# Patient Record
Sex: FEMALE | Race: WHITE | NOT HISPANIC OR LATINO | Employment: PART TIME | URBAN - METROPOLITAN AREA
[De-identification: names, ages, dates, MRNs, and addresses within clinical notes are randomized per-mention and may not be internally consistent; named-entity substitution may affect disease eponyms.]

---

## 2017-01-13 DIAGNOSIS — B00.9 HERPES SIMPLEX: ICD-10-CM

## 2017-01-13 RX ORDER — ACYCLOVIR 400 MG/1
400 TABLET ORAL 3 TIMES DAILY PRN
Qty: 15 TAB | Refills: 3 | Status: SHIPPED | OUTPATIENT
Start: 2017-01-13 | End: 2017-11-29

## 2017-02-09 DIAGNOSIS — Z79.890 PREMATURE SURGICAL MENOPAUSE ON HRT: ICD-10-CM

## 2017-02-09 DIAGNOSIS — E89.40 PREMATURE SURGICAL MENOPAUSE ON HRT: ICD-10-CM

## 2017-02-09 RX ORDER — ESTRADIOL 1 MG/1
1 TABLET ORAL DAILY
Qty: 90 TAB | Refills: 3 | Status: SHIPPED | OUTPATIENT
Start: 2017-02-09 | End: 2018-03-28 | Stop reason: SDUPTHER

## 2017-02-09 RX ORDER — ESTERIFIED ESTROGEN AND METHYLTESTOSTERONE .625; 1.25 MG/1; MG/1
1 TABLET ORAL DAILY
Qty: 90 TAB | Refills: 3 | Status: SHIPPED
Start: 2017-02-09 | End: 2017-09-05 | Stop reason: SDUPTHER

## 2017-02-09 NOTE — TELEPHONE ENCOUNTER
Was the patient seen in the last year in this department? Yes  last fill 1/13/17 #30    Does patient have an active prescription for medications requested? No     Received Request Via: Pharmacy

## 2017-02-16 ENCOUNTER — HOSPITAL ENCOUNTER (OUTPATIENT)
Facility: MEDICAL CENTER | Age: 47
End: 2017-02-16
Attending: NURSE PRACTITIONER
Payer: COMMERCIAL

## 2017-02-16 ENCOUNTER — OFFICE VISIT (OUTPATIENT)
Dept: MEDICAL GROUP | Facility: MEDICAL CENTER | Age: 47
End: 2017-02-16
Payer: COMMERCIAL

## 2017-02-16 VITALS
SYSTOLIC BLOOD PRESSURE: 98 MMHG | OXYGEN SATURATION: 97 % | DIASTOLIC BLOOD PRESSURE: 80 MMHG | BODY MASS INDEX: 23.9 KG/M2 | HEIGHT: 64 IN | WEIGHT: 140 LBS | HEART RATE: 91 BPM | TEMPERATURE: 98.6 F | RESPIRATION RATE: 12 BRPM

## 2017-02-16 DIAGNOSIS — N39.0 URINARY TRACT INFECTION, SITE UNSPECIFIED: ICD-10-CM

## 2017-02-16 LAB
APPEARANCE UR: NORMAL
BILIRUB UR STRIP-MCNC: NEGATIVE MG/DL
COLOR UR AUTO: YELLOW
GLUCOSE UR STRIP.AUTO-MCNC: NEGATIVE MG/DL
KETONES UR STRIP.AUTO-MCNC: NEGATIVE MG/DL
LEUKOCYTE ESTERASE UR QL STRIP.AUTO: NORMAL
NITRITE UR QL STRIP.AUTO: POSITIVE
PH UR STRIP.AUTO: 6 [PH] (ref 5–8)
PROT UR QL STRIP: NEGATIVE MG/DL
RBC UR QL AUTO: NORMAL
SP GR UR STRIP.AUTO: 1.02
UROBILINOGEN UR STRIP-MCNC: NEGATIVE MG/DL

## 2017-02-16 PROCEDURE — 87086 URINE CULTURE/COLONY COUNT: CPT

## 2017-02-16 PROCEDURE — 99214 OFFICE O/P EST MOD 30 MIN: CPT | Performed by: NURSE PRACTITIONER

## 2017-02-16 PROCEDURE — 81002 URINALYSIS NONAUTO W/O SCOPE: CPT | Performed by: NURSE PRACTITIONER

## 2017-02-16 RX ORDER — NITROFURANTOIN 25; 75 MG/1; MG/1
100 CAPSULE ORAL 2 TIMES DAILY
Qty: 14 CAP | Refills: 0 | Status: SHIPPED | OUTPATIENT
Start: 2017-02-16 | End: 2017-02-23

## 2017-02-16 NOTE — MR AVS SNAPSHOT
"Louann Infante   2017 11:40 AM   Office Visit   MRN: [id number]    Department:  [address]   Dept Phone:  [phone]    Description:  Female : 1970   Provider:  ELMER Blake           Reason for Visit     Urinary Frequency frequency & pain, onset 5 days       Allergies as of 2017     Allergen Noted Reactions    Codeine 2012   Vomiting    Hallucinations    Sulfa Drugs 2012   Vomiting      You were diagnosed with     Urinary tract infection, site unspecified   [[id number]]         Vital Signs     Blood Pressure Pulse Temperature Respirations Height Weight    98/80 mmHg 91 37 °C (98.6 °F) 12 1.626 m (5' 4\") 63.504 kg (140 lb)    Body Mass Index Oxygen Saturation Smoking Status             24.02 kg/m2 97% Never Smoker          Basic Information     Date Of Birth Sex Race Ethnicity Preferred Language    1970 Female White Non- English      Problem List              ICD-10-CM Priority Class Noted - Resolved    Sleep disorder G47.9   Unknown - Present    Migraines G43.909   Unknown - Present    Premature surgical menopause on HRT E89.40   10/22/2014 - Present      Health Maintenance        Date Due Completion Dates    IMM DTaP/Tdap/Td Vaccine (1 - Tdap) 7/10/1991 ---    MAMMOGRAM 7/10/2012 ---    IMM INFLUENZA (1) 2016 ---            Results     POCT Urinalysis      Component Value Standard Range & Units    POC Color yellow Negative    POC Appearance cloudy Negative    POC Leukocyte Esterase small Negative    POC Nitrites positive Negative    POC Urobiligen negative Negative (0.2) mg/dL    POC Protein negative Negative mg/dL    POC Urine PH 6.0 5.0 - 8.0    POC Blood small Negative    POC Specific Gravity 1.025 <1.005 - >1.030    POC Ketones negative Negative mg/dL    POC Biliruben negative Negative mg/dL    POC Glucose negative Negative mg/dL                        Current Immunizations     No immunizations on file.      Below and/or attached " are the medications your provider expects you to take. Review all of your home medications and newly ordered medications with your provider and/or pharmacist. Follow medication instructions as directed by your provider and/or pharmacist. Please keep your medication list with you and share with your provider. Update the information when medications are discontinued, doses are changed, or new medications (including over-the-counter products) are added; and carry medication information at all times in the event of emergency situations     Allergies:  CODEINE - Vomiting     SULFA DRUGS - Vomiting               Medications  Valid as of: February 16, 2017 - 12:01 PM    Generic Name Brand Name Tablet Size Instructions for use    Acyclovir (Tab) ZOVIRAX 400 MG Take 1 Tab by mouth 3 times a day as needed.        ALPRAZolam (Tab) XANAX 0.5 MG TAKE ONE TABLET BY MOUTH AT BEDTIME ASNEEDED FOR SLEEP -GENERIC FOR XANAX        Est Estrogens-Methyltest (Tab) ESTRATEST HS 0.625-1.25 MG Take 1 Tab by mouth every day.        Estradiol (Tab) ESTRACE 1 MG Take 1 Tab by mouth every day.        Fluticasone Propionate (Suspension) FLONASE 50 MCG/ACT Spray 2 Sprays in nose every day.        Nitrofurantoin Monohyd Macro (Cap) MACROBID 100 MG Take 1 Cap by mouth 2 times a day for 7 days.        Promethazine HCl (Tab) PHENERGAN 25 MG Take 0.5-1 Tabs by mouth every 6 hours as needed for Nausea/Vomiting.        SUMAtriptan Succinate (Tab) IMITREX 50 MG Take 1 Tab by mouth Once PRN for Migraine for up to 1 dose. Ok to repeat in 2 hours if migraine does not resolve        .                 Medicines prescribed today were sent to:     [id number] - [place] - [id number] [facility]    [address] [facility] [address] [place] [address]    Phone: [phone] Fax: [phone]    Open 24 Hours?: No      Medication refill instructions:       If your prescription bottle indicates you have medication refills left, it is not necessary to call your provider’s office. Please  contact your pharmacy and they will refill your medication.    If your prescription bottle indicates you do not have any refills left, you may request refills at any time through one of the following ways: The online [employer] system (except Urgent Care), by calling your provider’s office, or by asking your pharmacy to contact your provider’s office with a refill request. Medication refills are processed only during regular business hours and may not be available until the next business day. Your provider may request additional information or to have a follow-up visit with you prior to refilling your medication.   *Please Note: Medication refills are assigned a new Rx number when refilled electronically. Your pharmacy may indicate that no refills were authorized even though a new prescription for the same medication is available at the pharmacy. Please request the medicine by name with the pharmacy before contacting your provider for a refill.        Your To Do List     Future Labs/Procedures Complete By Expires    URINE CULTURE(NEW)  As directed 2/16/2018         [employer] Access Code: [email or web address]  Expires: 3/18/2017 12:01 PM    [employer]  A secure, online tool to manage your health information     [employer]’s [employer]® is a secure, online tool that connects you to your personalized health information from the privacy of your home -- day or night - making it very easy for you to manage your healthcare. Once the activation process is completed, you can even access your medical information using the [employer] avis, which is available for free in the Apple Avis store or Google Play store.     [employer] provides the following levels of access (as shown below):   My Chart Features   [facility] Doctor [facility]  Urgent  Care Non-[facility]  Primary Care  Doctor   Email your healthcare team securely and privately 24/7 X X X    Manage appointments: schedule your next appointment; view details of  past/upcoming appointments X      Request prescription refills. X      View recent personal medical records, including lab and immunizations X X X X   View health record, including health history, allergies, medications X X X X   Read reports about your outpatient visits, procedures, consult and ER notes X X X X   See your discharge summary, which is a recap of your hospital and/or ER visit that includes your diagnosis, lab results, and care plan. X X       How to register for [employer]:  1. Go to  [email or web address].  2. Click on the Sign Up Now box, which takes you to the New Member Sign Up page. You will need to provide the following information:  a. Enter your [employer] Access Code exactly as it appears at the top of this page. (You will not need to use this code after you’ve completed the sign-up process. If you do not sign up before the expiration date, you must request a new code.)   b. Enter your date of birth.   c. Enter your home email address.   d. Click Submit, and follow the next screen’s instructions.  3. Create a [employer] ID. This will be your [employer] login ID and cannot be changed, so think of one that is secure and easy to remember.  4. Create a [employer] password. You can change your password at any time.  5. Enter your Password Reset Question and Answer. This can be used at a later time if you forget your password.   6. Enter your e-mail address. This allows you to receive e-mail notifications when new information is available in [employer].  7. Click Sign Up. You can now view your health information.    For assistance activating your [employer] account, call [phone]

## 2017-02-17 DIAGNOSIS — N39.0 URINARY TRACT INFECTION, SITE UNSPECIFIED: ICD-10-CM

## 2017-02-19 LAB
BACTERIA UR CULT: NORMAL
SIGNIFICANT IND 70042: NORMAL
SOURCE SOURCE: NORMAL

## 2017-03-23 DIAGNOSIS — H69.91 EUSTACHIAN TUBE DYSFUNCTION, RIGHT: ICD-10-CM

## 2017-03-23 RX ORDER — FLUTICASONE PROPIONATE 50 MCG
2 SPRAY, SUSPENSION (ML) NASAL DAILY
Qty: 16 G | Refills: 5 | Status: SHIPPED | OUTPATIENT
Start: 2017-03-23 | End: 2017-11-29

## 2017-03-23 NOTE — TELEPHONE ENCOUNTER
Was the patient seen in the last year in this department? {Yes/No:20266}    Does patient have an active prescription for medications requested? {Yes/No:20266}    Received Request Via: {REFILL PATIENT/PHARMACY:[id number]}

## 2017-03-23 NOTE — TELEPHONE ENCOUNTER
Was the patient seen in the last year in this department? Yes Last office visit 02/16/2017    Does patient have an active prescription for medications requested? No     Received Request Via: Pharmacy

## 2017-04-13 DIAGNOSIS — G47.9 SLEEP DISORDER: ICD-10-CM

## 2017-04-13 RX ORDER — ALPRAZOLAM 0.5 MG/1
TABLET ORAL
Qty: 30 TAB | Refills: 3 | Status: SHIPPED
Start: 2017-04-13 | End: 2017-08-07 | Stop reason: SDUPTHER

## 2017-04-13 NOTE — TELEPHONE ENCOUNTER
Was the patient seen in the last year in this department? Yes     Does patient have an active prescription for medications requested? No     Received Request Via: Patient     Last visit:2/16/17    Last  fill: 3/14/17

## 2017-05-16 ENCOUNTER — TELEPHONE (OUTPATIENT)
Dept: NEUROLOGY | Facility: MEDICAL CENTER | Age: 47
End: 2017-05-16

## 2017-05-16 NOTE — TELEPHONE ENCOUNTER
Pt called asking for a refill for silenor to be renewed from the mail order pharmacy, pt last saw Dr. Montano 5/2016. Pt was made aware Dr. Montano is no longer in our office, pt can get the refill from her PCP or can make an appt with another provider in our office. Pt will ask PCP to fill. Will call and schedule if need be.

## 2017-06-13 ENCOUNTER — TELEPHONE (OUTPATIENT)
Dept: MEDICAL GROUP | Facility: LAB | Age: 47
End: 2017-06-13

## 2017-06-13 NOTE — TELEPHONE ENCOUNTER
1. Caller Name: Louann Infante                                         Call Back Number: [phone] (home)       Patient approves a detailed voicemail message: yes    2. What are the patient's symptoms (location & severity)? Migraines    3. Is this a new symptom no    Causing N/V, she would like to come in to the office for a shot to calm them down,

## 2017-08-07 DIAGNOSIS — G47.9 SLEEP DISORDER: ICD-10-CM

## 2017-08-07 RX ORDER — ALPRAZOLAM 0.5 MG/1
TABLET ORAL
Qty: 30 TAB | Refills: 3 | Status: SHIPPED
Start: 2017-08-07 | End: 2017-08-07 | Stop reason: SDUPTHER

## 2017-08-07 RX ORDER — ALPRAZOLAM 0.5 MG/1
TABLET ORAL
Qty: 30 TAB | Refills: 3 | Status: SHIPPED
Start: 2017-08-07 | End: 2017-11-29

## 2017-08-07 NOTE — TELEPHONE ENCOUNTER
Was the patient seen in the last year in this department? Yes     Does patient have an active prescription for medications requested? No     Received Request Via: Pharmacy     Last visit:2/16/17    Last  fill:7/4/17

## 2017-08-07 NOTE — TELEPHONE ENCOUNTER
Was the patient seen in the last year in this department? Yes  refill 7/4/17    Does patient have an active prescription for medications requested? No     Received Request Via: Pharmacy

## 2017-08-28 DIAGNOSIS — Z79.890 PREMATURE SURGICAL MENOPAUSE ON HRT: ICD-10-CM

## 2017-08-28 DIAGNOSIS — E89.40 PREMATURE SURGICAL MENOPAUSE ON HRT: ICD-10-CM

## 2017-09-05 DIAGNOSIS — E89.40 PREMATURE SURGICAL MENOPAUSE ON HRT: ICD-10-CM

## 2017-09-05 DIAGNOSIS — Z79.890 PREMATURE SURGICAL MENOPAUSE ON HRT: ICD-10-CM

## 2017-09-05 RX ORDER — ESTERIFIED ESTROGEN AND METHYLTESTOSTERONE .625; 1.25 MG/1; MG/1
1 TABLET ORAL DAILY
Qty: 90 TAB | Refills: 3 | Status: SHIPPED
Start: 2017-09-05 | End: 2018-03-28 | Stop reason: SDUPTHER

## 2017-09-05 NOTE — TELEPHONE ENCOUNTER
Was the patient seen in the last year in this department? Yes lov 2/16/2017  5/22/2017 90tabs    Does patient have an active prescription for medications requested? No     Received Request Via: Pharmacy

## 2017-09-14 ENCOUNTER — OFFICE VISIT (OUTPATIENT)
Dept: MEDICAL GROUP | Facility: LAB | Age: 47
End: 2017-09-14
Payer: COMMERCIAL

## 2017-09-14 VITALS
SYSTOLIC BLOOD PRESSURE: 98 MMHG | BODY MASS INDEX: 22.57 KG/M2 | TEMPERATURE: 99.4 F | HEART RATE: 89 BPM | OXYGEN SATURATION: 96 % | DIASTOLIC BLOOD PRESSURE: 68 MMHG | WEIGHT: 132.2 LBS | HEIGHT: 64 IN

## 2017-09-14 DIAGNOSIS — J02.9 PHARYNGITIS, UNSPECIFIED ETIOLOGY: ICD-10-CM

## 2017-09-14 PROCEDURE — 99213 OFFICE O/P EST LOW 20 MIN: CPT | Performed by: NURSE PRACTITIONER

## 2017-09-14 ASSESSMENT — PAIN SCALES - GENERAL: PAINLEVEL: 6=MODERATE PAIN

## 2017-09-14 NOTE — PROGRESS NOTES
Chief Complaint   Patient presents with   • Pharyngitis     x2days, headache       HISTORY OF PRESENT ILLNESS: Patient is a 47 y.o. female established patient [facility] who presents today to complain of a sore throat. 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        Pharyngitis  She has had a very sore throat for two days. She has not had a fever or chills but a terrible headache. She can swallow her own secretions and is eating and drinking ok. Her son has had bronchitis for three weeks.       Patient Active Problem List    Diagnosis Date Noted   • Pharyngitis 09/14/2017   • Premature surgical menopause on HRT 10/22/2014   • Sleep disorder    • Migraines        Allergies:Codeine and Sulfa drugs    Current Outpatient Prescriptions   Medication Sig Dispense Refill   • esterified estrogens-methyltest (ESTRATEST HS) 0.625-1.25 MG Tab Take 1 Tab by mouth every day. 90 Tab 3   • alprazolam (XANAX) 0.5 MG Tab TAKE ONE TABLET BY MOUTH AT BEDTIME ASNEEDED FOR SLEEP -GENERIC FOR XANAX 30 Tab 3   • fluticasone (FLONASE) 50 MCG/ACT nasal spray Spray 2 Sprays in nose every day. 16 g 5   • estradiol (ESTRACE) 1 MG Tab Take 1 Tab by mouth every day. 90 Tab 3   • acyclovir (ZOVIRAX) 400 MG tablet Take 1 Tab by mouth 3 times a day as needed. 15 Tab 3   • sumatriptan (IMITREX) 50 MG TABS Take 1 Tab by mouth Once PRN for Migraine for up to 1 dose. Ok to repeat in 2 hours if migraine does not resolve 9 Tab 3   • promethazine (PHENERGAN) 25 MG TABS Take 0.5-1 Tabs by mouth every 6 hours as needed for Nausea/Vomiting. 30 Tab 0     No current facility-administered medications for this visit.        Social History   Substance Use Topics   • Smoking status: Never Smoker   • Smokeless tobacco: Never Used   • Alcohol use Yes      Comment: occas       No family status information on file.     Family History   Problem Relation  "Age of Onset   • Diabetes Father    • Heart Disease Father    • Cancer Paternal Grandfather      lymphoma   • Stroke Neg Hx        ROS:  Review of Systems   Constitutional: Negative for fever, chills, weight loss andPositive for malaise/fatigue.   HENT: Positive for sore throat    Eyes: Negative for blurred vision.   Respiratory: Negative for cough, sputum production, shortness of breath and wheezing.    Cardiovascular: Negative for chest pain, palpitations, orthopnea and leg swelling.   Gastrointestinal: Negative for heartburn, nausea, vomiting and abdominal pain.   Genitourinary: Negative for dysuria, urgency and frequency.   Musculoskeletal: Negative for myalgias,   Skin: Negative for rash and itching.     Exam:  Blood pressure (!) 98/68, pulse 89, temperature 37.4 °C (99.4 °F), height 1.626 m (5' 4\"), weight 60 kg (132 lb 3.2 oz), SpO2 96 %, not currently breastfeeding.  General:  Well nourished, well developed female in NAD  Head : TMs are pearly white, nose with intact turbinates, pharynx with no erythema or swelling no exudate seen, no frontal or maxillary sinus tenderness  Neck: Supple without cervical lymphadenopathy  Pulmonary: Clear to ausculation  Normal effort. No rales, ronchi, or wheezing.  Cardiovascular: Regular rate and rhythm without murmur.  Lymphatic: No supraclavicular lymphadenopathy negative rapid strep       Please note that this dictation was created using voice recognition software. I have made every reasonable attempt to correct obvious errors, but I expect that there are errors of grammar and possibly content that I did not discover before finalizing the note.    Assessment/Plan:  1. Pharyngitis, unspecified etiology  Likely viral pharyngitis over-the-counter analgesic medication, increase fluids and gargle with salt water or use throat lozenges and rest return if symptoms worsen i.e. worsening fever cough shortness of breath.           "

## 2017-09-14 NOTE — ASSESSMENT & PLAN NOTE
She has had a very sore throat for two days. She has not had a fever or chills but a terrible headache. She can swallow her own secretions and is eating and drinking ok. Her son has had bronchitis for three weeks.

## 2017-11-29 ENCOUNTER — HOSPITAL ENCOUNTER (EMERGENCY)
Facility: MEDICAL CENTER | Age: 47
End: 2017-11-29
Attending: EMERGENCY MEDICINE
Payer: COMMERCIAL

## 2017-11-29 VITALS
OXYGEN SATURATION: 100 % | SYSTOLIC BLOOD PRESSURE: 117 MMHG | HEART RATE: 71 BPM | RESPIRATION RATE: 18 BRPM | WEIGHT: 136.69 LBS | HEIGHT: 65 IN | TEMPERATURE: 97.4 F | DIASTOLIC BLOOD PRESSURE: 65 MMHG | BODY MASS INDEX: 22.77 KG/M2

## 2017-11-29 DIAGNOSIS — G43.C0 PERIODIC HEADACHE SYNDROME, NOT INTRACTABLE: ICD-10-CM

## 2017-11-29 PROCEDURE — 99284 EMERGENCY DEPT VISIT MOD MDM: CPT

## 2017-11-29 PROCEDURE — 700105 HCHG RX REV CODE 258: Performed by: EMERGENCY MEDICINE

## 2017-11-29 PROCEDURE — 700111 HCHG RX REV CODE 636 W/ 250 OVERRIDE (IP): Performed by: EMERGENCY MEDICINE

## 2017-11-29 PROCEDURE — 96374 THER/PROPH/DIAG INJ IV PUSH: CPT

## 2017-11-29 PROCEDURE — 96372 THER/PROPH/DIAG INJ SC/IM: CPT

## 2017-11-29 PROCEDURE — 96361 HYDRATE IV INFUSION ADD-ON: CPT

## 2017-11-29 PROCEDURE — 96375 TX/PRO/DX INJ NEW DRUG ADDON: CPT

## 2017-11-29 RX ORDER — KETOROLAC TROMETHAMINE 30 MG/ML
15 INJECTION, SOLUTION INTRAMUSCULAR; INTRAVENOUS ONCE
Status: COMPLETED | OUTPATIENT
Start: 2017-11-29 | End: 2017-11-29

## 2017-11-29 RX ORDER — METOCLOPRAMIDE HYDROCHLORIDE 5 MG/ML
10 INJECTION INTRAMUSCULAR; INTRAVENOUS ONCE
Status: COMPLETED | OUTPATIENT
Start: 2017-11-29 | End: 2017-11-29

## 2017-11-29 RX ORDER — DEXAMETHASONE SODIUM PHOSPHATE 4 MG/ML
15 INJECTION, SOLUTION INTRA-ARTICULAR; INTRALESIONAL; INTRAMUSCULAR; INTRAVENOUS; SOFT TISSUE ONCE
Status: COMPLETED | OUTPATIENT
Start: 2017-11-29 | End: 2017-11-29

## 2017-11-29 RX ORDER — SODIUM CHLORIDE 9 MG/ML
1000 INJECTION, SOLUTION INTRAVENOUS ONCE
Status: COMPLETED | OUTPATIENT
Start: 2017-11-29 | End: 2017-11-29

## 2017-11-29 RX ORDER — DIPHENHYDRAMINE HYDROCHLORIDE 50 MG/ML
25 INJECTION INTRAMUSCULAR; INTRAVENOUS ONCE
Status: COMPLETED | OUTPATIENT
Start: 2017-11-29 | End: 2017-11-29

## 2017-11-29 RX ORDER — ONDANSETRON 2 MG/ML
4 INJECTION INTRAMUSCULAR; INTRAVENOUS ONCE
Status: COMPLETED | OUTPATIENT
Start: 2017-11-29 | End: 2017-11-29

## 2017-11-29 RX ORDER — ALPRAZOLAM 0.5 MG/1
0.5 TABLET ORAL NIGHTLY PRN
Status: SHIPPED | COMMUNITY
End: 2017-11-29 | Stop reason: SDUPTHER

## 2017-11-29 RX ORDER — IBUPROFEN 200 MG
400 TABLET ORAL EVERY 6 HOURS PRN
Status: SHIPPED | COMMUNITY
End: 2020-07-14

## 2017-11-29 RX ORDER — SUMATRIPTAN 6 MG/.5ML
6 INJECTION, SOLUTION SUBCUTANEOUS ONCE
Status: COMPLETED | OUTPATIENT
Start: 2017-11-29 | End: 2017-11-29

## 2017-11-29 RX ADMIN — DIPHENHYDRAMINE HYDROCHLORIDE 25 MG: 50 INJECTION, SOLUTION INTRAMUSCULAR; INTRAVENOUS at 09:04

## 2017-11-29 RX ADMIN — METOCLOPRAMIDE 10 MG: 5 INJECTION, SOLUTION INTRAMUSCULAR; INTRAVENOUS at 08:54

## 2017-11-29 RX ADMIN — SODIUM CHLORIDE 1000 ML: 9 INJECTION, SOLUTION INTRAVENOUS at 08:54

## 2017-11-29 RX ADMIN — DEXAMETHASONE SODIUM PHOSPHATE 15 MG: 4 INJECTION, SOLUTION INTRAMUSCULAR; INTRAVENOUS at 08:54

## 2017-11-29 RX ADMIN — SUMATRIPTAN 6 MG: 6 INJECTION, SOLUTION SUBCUTANEOUS at 08:57

## 2017-11-29 RX ADMIN — KETOROLAC TROMETHAMINE 15 MG: 30 INJECTION, SOLUTION INTRAMUSCULAR at 09:52

## 2017-11-29 RX ADMIN — ONDANSETRON 4 MG: 2 INJECTION INTRAMUSCULAR; INTRAVENOUS at 09:53

## 2017-11-29 ASSESSMENT — PAIN SCALES - GENERAL: PAINLEVEL_OUTOF10: 0

## 2017-11-29 NOTE — ED NOTES
Iv placed  . Pt medicated as directed by ER md, poc update given to pt. Further orders and dispo pending at this time. No further questions from pt at this time

## 2017-11-29 NOTE — ED PROVIDER NOTES
ED Provider Note    CHIEF COMPLAINT  Chief Complaint   Patient presents with   • Migraine       HPI  Louann Infante is a 47 y.o. female who presents complaining of headache. The headache is bifrontal. It is associated with nausea and vomiting. To social with photophobia. Severe in nature. 9/10. The pain radiates from the back of the neck up. She states she's been diagnosed with migraine headaches previously. She was followed by the [facility] neurology group. She does not report neurologist. She states this is not the worsening of her life. She denies fever or chills. She did have an aura.    REVIEW OF SYSTEMS  See HPI for further details. No fever no chills. No abdominal pain. Positive nausea and vomiting. No chest pain. No cough. All other systems are negative.    PAST MEDICAL HISTORY  Past Medical History:   Diagnosis Date   • Migraine    • Pharyngitis 9/14/2017   • Sleep disorder        FAMILY HISTORY  Family History   Problem Relation Age of Onset   • Diabetes Father    • Heart Disease Father    • Cancer Paternal Grandfather      lymphoma   • Stroke Neg Hx        SOCIAL HISTORY  Social History     Social History   • Marital status:      Spouse name: N/A   • Number of children: N/A   • Years of education: N/A     Social History Main Topics   • Smoking status: Never Smoker   • Smokeless tobacco: Never Used   • Alcohol use Yes      Comment: occas   • Drug use: No   • Sexual activity: Not on file      Comment: , homemaker     Other Topics Concern   • Not on file     Social History Narrative   • No narrative on file       SURGICAL HISTORY  Past Surgical History:   Procedure Laterality Date   • ABDOMINAL HYSTERECTOMY TOTAL  2006    bleeding,vaginal   • CHOLECYSTECTOMY  1997   • PRIMARY C SECTION      x2       CURRENT MEDICATIONS  Home Medications     Reviewed by Ishaan Fernandes (Pharmacy Tech) on 11/29/17 at 0834  Med List Status: Complete   Medication Last Dose Status   alprazolam (XANAX) 0.5  "MG Tab 11/28/2017 Active   esterified estrogens-methyltest (ESTRATEST HS) 0.625-1.25 MG Tab 11/28/2017 Active   estradiol (ESTRACE) 1 MG Tab 11/28/2017 Active   ibuprofen (MOTRIN) 200 MG Tab 11/29/2017 Active                ALLERGIES  Allergies   Allergen Reactions   • Codeine Vomiting     Hallucinations   • Sulfa Drugs Vomiting       PHYSICAL EXAM  VITAL SIGNS: /76   Pulse 74   Temp 36.3 °C (97.4 °F)   Resp 18   Ht 1.651 m (5' 5\")   Wt 62 kg (136 lb 11 oz)   SpO2 100%   BMI 22.75 kg/m²   Constitutional:  Well developed, Well nourished, Appears uncomfortable    HENT: Oropharynx dry  Eyes: PERRLA, EOMI, Conjunctiva normal, No discharge.   Neck: Normal range of motion, No tenderness, Supple, No stridor.   Lymphatic: No lymphadenopathy noted.   Cardiovascular: Normal heart rate, Normal rhythm, No murmurs, No rubs, No gallops.   Thorax & Lungs: Normal breath sounds, No respiratory distress, No wheezing, No chest tenderness.   Skin: Warm, Dry, No erythema, No rash.   Extremities: No edema, No tenderness, No cyanosis, No clubbing. Dorsalis pedis pulses 2+ equal bilaterally. Radial pulses 2+ equal bilaterally  Neurologic: Alert & oriented x 3, Normal motor function, Normal sensory function, No focal deficits noted.   Psychiatric: Affect normal, Judgment normal, Mood normal.     RADIOLOGY/PROCEDURES  Not indicated    COURSE & MEDICAL DECISION MAKING  Pertinent Labs & Imaging studies reviewed. (See chart for details)  IV was established. Patient was given IV Reglan and dexamethasone. Patient was also given IV fluids because of her multiple bouts of nausea and vomiting. She had quite a bit of pain relief with Imitrex. After dose of Zofran and Toradol her headache was gone. Patient was reassured and be discharged home. I recommended following up with the neurologist as she may benefit from migraine prophylaxis. Patient was discharged home in stable condition    FINAL IMPRESSION  1. Migraine headache  2. "   3.        Electronically signed by: Hasmukh Parker, 11/29/2017 10:27 AM

## 2017-11-29 NOTE — TELEPHONE ENCOUNTER
Was the patient seen in the last year in this department? Yes  Last Fill 11/1/17 #30    Does patient have an active prescription for medications requested? No     Received Request Via: Pharmacy

## 2017-11-29 NOTE — ED NOTES
D/c pt home . Pt aware of f/u instructions , aware to return for any changes or concerns. No further questions upon d/c home from ed

## 2017-11-30 RX ORDER — ALPRAZOLAM 0.5 MG/1
0.5 TABLET ORAL NIGHTLY PRN
Qty: 30 TAB | Refills: 0 | Status: SHIPPED
Start: 2017-11-30 | End: 2017-12-29 | Stop reason: SDUPTHER

## 2017-12-29 DIAGNOSIS — F41.1 GAD (GENERALIZED ANXIETY DISORDER): ICD-10-CM

## 2017-12-29 RX ORDER — ALPRAZOLAM 0.5 MG/1
0.5 TABLET ORAL NIGHTLY PRN
Qty: 30 TAB | Refills: 0 | Status: SHIPPED
Start: 2017-12-29 | End: 2018-01-30 | Stop reason: SDUPTHER

## 2017-12-29 NOTE — TELEPHONE ENCOUNTER
Prescription called in to:    [id number] - [place] - [id number] [facility] [place]  [address] [facility]  [place] [address]  Phone: [phone] Fax: [phone]  .

## 2017-12-29 NOTE — TELEPHONE ENCOUNTER
Was the patient seen in the last year in this department? Yes     Does patient have an active prescription for medications requested? No     Received Request Via: Pharmacy     Per  last fill: 11-30-17 # 30

## 2018-01-30 DIAGNOSIS — F41.1 GAD (GENERALIZED ANXIETY DISORDER): ICD-10-CM

## 2018-01-30 RX ORDER — ALPRAZOLAM 0.5 MG/1
0.5 TABLET ORAL NIGHTLY PRN
Qty: 30 TAB | Refills: 0 | Status: SHIPPED
Start: 2018-01-30 | End: 2018-03-05 | Stop reason: SDUPTHER

## 2018-01-30 NOTE — TELEPHONE ENCOUNTER
Was the patient seen in the last year in this department? Yes   Does patient have an active prescription for medications requested? No   Received Request Via: Pharmacy      last filled: 12/29/2017, #30

## 2018-03-05 DIAGNOSIS — F41.1 GAD (GENERALIZED ANXIETY DISORDER): ICD-10-CM

## 2018-03-05 RX ORDER — ALPRAZOLAM 0.5 MG/1
0.5 TABLET ORAL NIGHTLY PRN
Qty: 30 TAB | Refills: 0 | Status: SHIPPED
Start: 2018-03-05 | End: 2018-03-28 | Stop reason: SDUPTHER

## 2018-03-05 NOTE — TELEPHONE ENCOUNTER
Was the patient seen in the last year in this department? Yes 1/31/18 # 30    Does patient have an active prescription for medications requested? No     Received Request Via: Pharmacy

## 2018-03-14 ENCOUNTER — HOSPITAL ENCOUNTER (OUTPATIENT)
Dept: LAB | Facility: MEDICAL CENTER | Age: 48
End: 2018-03-14
Attending: NURSE PRACTITIONER
Payer: COMMERCIAL

## 2018-03-14 ENCOUNTER — OFFICE VISIT (OUTPATIENT)
Dept: MEDICAL GROUP | Facility: LAB | Age: 48
End: 2018-03-14
Payer: COMMERCIAL

## 2018-03-14 VITALS
TEMPERATURE: 99.7 F | SYSTOLIC BLOOD PRESSURE: 92 MMHG | HEIGHT: 65 IN | BODY MASS INDEX: 21.99 KG/M2 | HEART RATE: 68 BPM | OXYGEN SATURATION: 97 % | DIASTOLIC BLOOD PRESSURE: 70 MMHG | WEIGHT: 132 LBS | RESPIRATION RATE: 12 BRPM

## 2018-03-14 DIAGNOSIS — H01.119 EYELID DERMATITIS, ALLERGIC/CONTACT: ICD-10-CM

## 2018-03-14 DIAGNOSIS — Z13.220 SCREENING FOR HYPERLIPIDEMIA: ICD-10-CM

## 2018-03-14 DIAGNOSIS — M25.50 MULTIPLE JOINT PAIN: ICD-10-CM

## 2018-03-14 LAB
25(OH)D3 SERPL-MCNC: 24 NG/ML (ref 30–100)
ANION GAP SERPL CALC-SCNC: 7 MMOL/L (ref 0–11.9)
BASOPHILS # BLD AUTO: 0.8 % (ref 0–1.8)
BASOPHILS # BLD: 0.05 K/UL (ref 0–0.12)
BUN SERPL-MCNC: 14 MG/DL (ref 8–22)
CALCIUM SERPL-MCNC: 9.1 MG/DL (ref 8.5–10.5)
CHLORIDE SERPL-SCNC: 106 MMOL/L (ref 96–112)
CHOLEST SERPL-MCNC: 186 MG/DL (ref 100–199)
CO2 SERPL-SCNC: 28 MMOL/L (ref 20–33)
CREAT SERPL-MCNC: 0.93 MG/DL (ref 0.5–1.4)
EOSINOPHIL # BLD AUTO: 0.09 K/UL (ref 0–0.51)
EOSINOPHIL NFR BLD: 1.5 % (ref 0–6.9)
ERYTHROCYTE [DISTWIDTH] IN BLOOD BY AUTOMATED COUNT: 44.4 FL (ref 35.9–50)
GLUCOSE SERPL-MCNC: 85 MG/DL (ref 65–99)
HCT VFR BLD AUTO: 47 % (ref 37–47)
HDLC SERPL-MCNC: 75 MG/DL
HGB BLD-MCNC: 15.2 G/DL (ref 12–16)
IMM GRANULOCYTES # BLD AUTO: 0.01 K/UL (ref 0–0.11)
IMM GRANULOCYTES NFR BLD AUTO: 0.2 % (ref 0–0.9)
LDLC SERPL CALC-MCNC: 92 MG/DL
LYMPHOCYTES # BLD AUTO: 1.53 K/UL (ref 1–4.8)
LYMPHOCYTES NFR BLD: 25.5 % (ref 22–41)
MCH RBC QN AUTO: 30.7 PG (ref 27–33)
MCHC RBC AUTO-ENTMCNC: 32.3 G/DL (ref 33.6–35)
MCV RBC AUTO: 94.9 FL (ref 81.4–97.8)
MONOCYTES # BLD AUTO: 0.39 K/UL (ref 0–0.85)
MONOCYTES NFR BLD AUTO: 6.5 % (ref 0–13.4)
NEUTROPHILS # BLD AUTO: 3.94 K/UL (ref 2–7.15)
NEUTROPHILS NFR BLD: 65.5 % (ref 44–72)
NRBC # BLD AUTO: 0 K/UL
NRBC BLD-RTO: 0 /100 WBC
PLATELET # BLD AUTO: 237 K/UL (ref 164–446)
PMV BLD AUTO: 10.3 FL (ref 9–12.9)
POTASSIUM SERPL-SCNC: 4.5 MMOL/L (ref 3.6–5.5)
RBC # BLD AUTO: 4.95 M/UL (ref 4.2–5.4)
RHEUMATOID FACT SER IA-ACNC: <10 IU/ML (ref 0–14)
SODIUM SERPL-SCNC: 141 MMOL/L (ref 135–145)
TRIGL SERPL-MCNC: 97 MG/DL (ref 0–149)
TSH SERPL DL<=0.005 MIU/L-ACNC: 2.83 UIU/ML (ref 0.38–5.33)
WBC # BLD AUTO: 6 K/UL (ref 4.8–10.8)

## 2018-03-14 PROCEDURE — 80048 BASIC METABOLIC PNL TOTAL CA: CPT

## 2018-03-14 PROCEDURE — 36415 COLL VENOUS BLD VENIPUNCTURE: CPT

## 2018-03-14 PROCEDURE — 99214 OFFICE O/P EST MOD 30 MIN: CPT | Performed by: NURSE PRACTITIONER

## 2018-03-14 PROCEDURE — 85025 COMPLETE CBC W/AUTO DIFF WBC: CPT

## 2018-03-14 PROCEDURE — 86038 ANTINUCLEAR ANTIBODIES: CPT

## 2018-03-14 PROCEDURE — 82306 VITAMIN D 25 HYDROXY: CPT

## 2018-03-14 PROCEDURE — 80061 LIPID PANEL: CPT

## 2018-03-14 PROCEDURE — 86431 RHEUMATOID FACTOR QUANT: CPT

## 2018-03-14 PROCEDURE — 84443 ASSAY THYROID STIM HORMONE: CPT

## 2018-03-14 PROCEDURE — 86200 CCP ANTIBODY: CPT

## 2018-03-14 RX ORDER — TRIAMCINOLONE ACETONIDE 1 MG/G
1 OINTMENT TOPICAL 2 TIMES DAILY
Qty: 15 TUBE | Refills: 0 | Status: SHIPPED | OUTPATIENT
Start: 2018-03-14

## 2018-03-14 ASSESSMENT — PATIENT HEALTH QUESTIONNAIRE - PHQ9: CLINICAL INTERPRETATION OF PHQ2 SCORE: 0

## 2018-03-14 NOTE — PROGRESS NOTES
"Chief Complaint   Patient presents with   • Rash     pt states she has itchy red rash around he left eye, onset 3 weeks    • Other     pt would like to discuss how to prevent arthritis    • Generalized Body Aches     pt states she is waking up body aches        HPI  47-year-old established female here with several issues:   #1-multiple joint pain: New issue to me today. Suffers from aching and discomfort in both Ankles, knees, shoulders, hands in the morning - she states that she feels like she has the flu every morning - present for a year. Getting up to exercise and stretch helps a lot. She's not taking anything for her pain.   Hands get very cold, numb, tingle and discolor in cold weather / water.    Mom and maternal aunt have a hx of severe hand arthritis.  Mom has osteoarthritis, PGM had RA.  #2-she's had an itchy rash that is moving into different places to upper and lower eyelids as well as beside her eyes. The rash has been present for several weeks. She did start a new facial vitamin C cream around the time that the rash started. She denies any other associated symptoms such as eye discharge, vision changes or eye itching. She has not tried anything for her rash. No history of the same.       Past medical, surgical, family, and social history is reviewed and updated in Epic chart by me today.   Medications and allergies reviewed and updated in Epic chart by me today.     ROS:   As documented in history of present illness above    Exam:  Blood pressure (!) 92/70, pulse 68, temperature 37.6 °C (99.7 °F), resp. rate 12, height 1.651 m (5' 5\"), weight 59.9 kg (132 lb), SpO2 97 %.  Constitutional: Alert, no distress, plus 3 vital signs  Skin:  Warm, dry. She has a dry, slightly scaling with scattered papular small rash to the lateral aspect of her left upper eyelid and medial temporal area - measuring about 1 cm. Otherwise the skin around her eyes just appears slightly dry.  Eye: Equal, round and reactive, " conjunctiva clear  ENMT: Lips without lesions, good dentition, oropharynx clear    Neck: Trachea midline.  Respiratory: Unlabored respiration, lungs clear to auscultation, no wheezes, no rhonchi  Cardiovascular: Normal rate and rhythm.  Musculoskeletal: She does not have deformity to hands, feet, ankles or shoulders. She has no redness or swelling to her joints.  are 2 out of 2 bilaterally.  Psych: Alert, pleasant, well-groomed, normal affect    A/P:  1. Eyelid dermatitis, allergic/contact  -Encouraged her to discontinue the use of her vitamin C facial moisturizer although she reassures me that she does not think this is the culprit as she's been using it all over her face and the rash is only near her eyes. Discussed emollient moisturizers to the area in the evening such as Vaseline or Aquaphor. Trial of a very thin layer of triamcinolone ointment to medial temporal area that is not associated with eyelids are eyes when she tends daily for one. Discussed skin thinning properties of topical steroids. Follow-up if not resolved.  - triamcinolone acetonide (KENALOG) 0.1 % Ointment; Apply 1 Application to affected area(s) 2 times a day.  Dispense: 15 Tube; Refill: 0    2. Multiple joint pain  -Recommend a workup for inflammatory arthritis outside of osteoarthritis as below. Discussed the importance of topical anti-inflammatories, warmth and continued exercise. I'll follow-up with her with her lab work when he returns area itched discussed that she may also need x-rays of feet and hands. We also had a discussion about Raynaud phenomenon including treatment, symptomatic control and rare repercussions outside of gangrene in severe cases.  - RHEUMATOID ARTHRITIS FACTOR; Future  - ANTI-NUCLEAR ANTIBODY SERUM; Future  - CCP ANTIBODY; Future  - TSH WITH REFLEX TO FT4; Future  - BASIC METABOLIC PANEL; Future  - CBC WITH DIFFERENTIAL; Future  - VITAMIN D,25 HYDROXY; Future    3. Screening for hyperlipidemia  - LIPID  PROFILE; Future

## 2018-03-16 LAB — CCP IGG SERPL-ACNC: 7 UNITS (ref 0–19)

## 2018-03-17 LAB — NUCLEAR IGG SER QL IA: NORMAL

## 2018-03-22 ENCOUNTER — OFFICE VISIT (OUTPATIENT)
Dept: MEDICAL GROUP | Facility: LAB | Age: 48
End: 2018-03-22
Payer: COMMERCIAL

## 2018-03-22 VITALS
OXYGEN SATURATION: 95 % | TEMPERATURE: 99.5 F | WEIGHT: 132 LBS | RESPIRATION RATE: 12 BRPM | HEART RATE: 81 BPM | SYSTOLIC BLOOD PRESSURE: 110 MMHG | HEIGHT: 65 IN | DIASTOLIC BLOOD PRESSURE: 82 MMHG | BODY MASS INDEX: 21.99 KG/M2

## 2018-03-22 DIAGNOSIS — E55.9 VITAMIN D DEFICIENCY: ICD-10-CM

## 2018-03-22 DIAGNOSIS — R05.8 COUGH PRESENT FOR GREATER THAN 3 WEEKS: ICD-10-CM

## 2018-03-22 PROCEDURE — 99213 OFFICE O/P EST LOW 20 MIN: CPT | Performed by: NURSE PRACTITIONER

## 2018-03-22 NOTE — PATIENT INSTRUCTIONS
Generic claritin / zyrtec 10 mg twice daily + pepcid AC (generic is fine) twice daily for 1-2 weeks depending on cough resolution.

## 2018-03-22 NOTE — PROGRESS NOTES
"Chief Complaint   Patient presents with   • Cough     pt states she has dry cough, no other noted symptoms, onset 3 months        HPI  Anna is a 47-year-old established female here with complaint of an aggravating nighttime cough x 3 weeks.  Trying OTC cough suppressants, which do help.  Cough is very dry and feels irritated.  Initially had a sore throat but throat pain resolved within 2 days.  No asthma.  No hx of smoking.  No heart burn. No allergies in the past.  New insulation 2-3 weeks and new carpet 4 days ago.  Last tdap was over 10 years.  No other associated symptoms such as general malaise, fevers, chills, nausea, vomiting or abdominal pain.    She also had lab work done because of multiple joint pain and the only thing that came back remarkable was a low vitamin D, she would like to review this today.    Past medical, surgical, family, and social history is reviewed and updated in Epic chart by me today.   Medications and allergies reviewed and updated in Epic chart by me today.     ROS:   As documented in history of present illness above    Exam:  Blood pressure 110/82, pulse 81, temperature 37.5 °C (99.5 °F), resp. rate 12, height 1.651 m (5' 5\"), weight 59.9 kg (132 lb), SpO2 95 %.  Constitutional: Alert, no distress, plus 3 vital signs  Skin:  Warm, dry, no rashes invisible areas  Eye: Equal, round and reactive, conjunctiva clear  ENMT: Lips without lesions, good dentition, oropharynx clear    Neck: Trachea midline  Respiratory: Unlabored respiration, lungs clear to auscultation, no wheezes, no rhonchi  Cardiovascular: Normal rate and rhythm  Psych: Alert, pleasant, well-groomed, normal affect    A/P:  1. Cough present for greater than 3 weeks  -Suspect she has a post infectious inflammatory cough. She'll start generic Claritin 10 mg twice daily with Pepcid AC twice daily for at least one week. She'll continue with high water intake. She may continue with her nighttime cough suppressants. I " encouraged her to email me in one week if her cough has not resolved. Discussed potential side effects of these antihistamines such as dry mouth and eyes. If her cough persists, I recommended a chest x-ray, pertussis testing and possible prednisone.    2. Vitamin D deficiency  -Encouraged her to start vitamin D 2-4000 international units daily. She also sees Qi Alcala and would like to discuss this with her as well. Reviewed all of her negative testing for inflammatory arthritis.

## 2018-03-28 DIAGNOSIS — Z79.890 PREMATURE SURGICAL MENOPAUSE ON HRT: ICD-10-CM

## 2018-03-28 DIAGNOSIS — F41.1 GAD (GENERALIZED ANXIETY DISORDER): ICD-10-CM

## 2018-03-28 DIAGNOSIS — E89.40 PREMATURE SURGICAL MENOPAUSE ON HRT: ICD-10-CM

## 2018-03-28 RX ORDER — ALPRAZOLAM 0.5 MG/1
0.5 TABLET ORAL NIGHTLY PRN
Qty: 30 TAB | Refills: 0 | Status: SHIPPED
Start: 2018-03-28 | End: 2018-05-08 | Stop reason: SDUPTHER

## 2018-03-28 RX ORDER — ESTRADIOL 1 MG/1
1 TABLET ORAL DAILY
Qty: 90 TAB | Refills: 3 | Status: SHIPPED | OUTPATIENT
Start: 2018-03-28 | End: 2019-04-29 | Stop reason: SDUPTHER

## 2018-03-28 RX ORDER — ESTERIFIED ESTROGEN AND METHYLTESTOSTERONE .625; 1.25 MG/1; MG/1
1 TABLET ORAL DAILY
Qty: 90 TAB | Refills: 3 | Status: SHIPPED
Start: 2018-03-28 | End: 2018-11-14 | Stop reason: SDUPTHER

## 2018-03-28 NOTE — TELEPHONE ENCOUNTER
Was the patient seen in the last year in this department? Yes   12/14/17 # 90 [facility]    3/5/18 # 30 alpraz    Does patient have an active prescription for medications requested? No     Received Request Via: Pharmacy

## 2018-05-08 DIAGNOSIS — F41.1 GAD (GENERALIZED ANXIETY DISORDER): ICD-10-CM

## 2018-05-08 RX ORDER — ALPRAZOLAM 0.5 MG/1
0.5 TABLET ORAL NIGHTLY PRN
Qty: 30 TAB | Refills: 0 | Status: SHIPPED
Start: 2018-05-08 | End: 2018-06-04 | Stop reason: SDUPTHER

## 2018-05-08 NOTE — TELEPHONE ENCOUNTER
Was the patient seen in the last year in this department? Yes 4/4/18 #30    Does patient have an active prescription for medications requested? No     Received Request Via: Pharmacy

## 2018-05-24 RX ORDER — METRONIDAZOLE 7.5 MG/G
1 GEL TOPICAL 2 TIMES DAILY
Qty: 1 TUBE | Refills: 1 | Status: SHIPPED | OUTPATIENT
Start: 2018-05-24

## 2018-06-04 DIAGNOSIS — F41.1 GAD (GENERALIZED ANXIETY DISORDER): ICD-10-CM

## 2018-06-04 RX ORDER — ALPRAZOLAM 0.5 MG/1
0.5 TABLET ORAL NIGHTLY PRN
Qty: 30 TAB | Refills: 0 | Status: SHIPPED
Start: 2018-06-04 | End: 2018-07-02 | Stop reason: SDUPTHER

## 2018-06-04 NOTE — TELEPHONE ENCOUNTER
Was the patient seen in the last year in this department? Yes [facility] [place] 5/8/18 #30    Does patient have an active prescription for medications requested? No     Received Request Via: Pharmacy

## 2018-07-02 DIAGNOSIS — F41.1 GAD (GENERALIZED ANXIETY DISORDER): ICD-10-CM

## 2018-07-02 RX ORDER — ALPRAZOLAM 0.5 MG/1
0.5 TABLET ORAL NIGHTLY PRN
Qty: 30 TAB | Refills: 0 | Status: SHIPPED
Start: 2018-07-02 | End: 2018-08-06 | Stop reason: SDUPTHER

## 2018-07-02 NOTE — TELEPHONE ENCOUNTER
Was the patient seen in the last year in this department? Yes [facility] 6/5/18 #30    Does patient have an active prescription for medications requested? No     Received Request Via: Pharmacy

## 2018-08-01 ENCOUNTER — TELEPHONE (OUTPATIENT)
Dept: MEDICAL GROUP | Facility: LAB | Age: 48
End: 2018-08-01

## 2018-08-01 NOTE — TELEPHONE ENCOUNTER
ESTABLISHED PATIENT PRE-VISIT PLANNING     Note: Patient will not be contacted if there is no indication to call.     1.  Reviewed notes from the last few office visits within the medical group: Yes    2.  If any orders were placed at last visit or intended to be done for this visit (i.e. 6 mos follow-up), do we have Results/Consult Notes?        •  Labs - Labs were not ordered at last office visit.       •  Imaging - Imaging was not ordered at last office visit.       •  Referrals - No referrals were ordered at last office visit.    3. Is this appointment scheduled as a Hospital Follow-Up? No    4.  Immunizations were updated in Epic using WebIZ?: No WebIZ record       •  Web Iz Recommendations:     5.  Patient is due for the following Health Maintenance Topics:   Health Maintenance Due   Topic Date Due   • IMM DTaP/Tdap/Td Vaccine (1 - Tdap) 07/10/1991   • MAMMOGRAM  07/10/2012           6.  MDX printed for Provider? NO    7.  Patient was NOT informed to arrive 15 min prior to their scheduled appointment and bring in their medication bottles.

## 2018-08-02 ENCOUNTER — OFFICE VISIT (OUTPATIENT)
Dept: MEDICAL GROUP | Facility: LAB | Age: 48
End: 2018-08-02
Payer: COMMERCIAL

## 2018-08-02 VITALS
OXYGEN SATURATION: 96 % | HEART RATE: 78 BPM | RESPIRATION RATE: 12 BRPM | BODY MASS INDEX: 23.32 KG/M2 | TEMPERATURE: 100.2 F | SYSTOLIC BLOOD PRESSURE: 116 MMHG | WEIGHT: 140 LBS | DIASTOLIC BLOOD PRESSURE: 72 MMHG | HEIGHT: 65 IN

## 2018-08-02 DIAGNOSIS — M54.2 POSTERIOR NECK PAIN: ICD-10-CM

## 2018-08-02 PROCEDURE — 99214 OFFICE O/P EST MOD 30 MIN: CPT | Performed by: NURSE PRACTITIONER
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
## 2018-08-06 DIAGNOSIS — F41.1 GAD (GENERALIZED ANXIETY DISORDER): ICD-10-CM

## 2018-08-06 RX ORDER — ALPRAZOLAM 0.5 MG/1
0.5 TABLET ORAL NIGHTLY PRN
Qty: 30 TAB | Refills: 0 | Status: SHIPPED
Start: 2018-08-06 | End: 2018-09-10 | Stop reason: SDUPTHER

## 2018-08-06 NOTE — TELEPHONE ENCOUNTER
Was the patient seen in the last year in this department? YesPMP #30 7/2/18    Does patient have an active prescription for medications requested? No     Received Request Via: Pharmacy

## 2018-08-13 ENCOUNTER — HOSPITAL ENCOUNTER (OUTPATIENT)
Dept: RADIOLOGY | Facility: MEDICAL CENTER | Age: 48
End: 2018-08-13
Attending: NURSE PRACTITIONER
Payer: COMMERCIAL

## 2018-08-13 DIAGNOSIS — M54.2 POSTERIOR NECK PAIN: ICD-10-CM

## 2018-08-13 PROCEDURE — 72040 X-RAY EXAM NECK SPINE 2-3 VW: CPT

## 2018-09-10 DIAGNOSIS — F41.1 GAD (GENERALIZED ANXIETY DISORDER): ICD-10-CM

## 2018-09-10 RX ORDER — ALPRAZOLAM 0.5 MG/1
0.5 TABLET ORAL NIGHTLY PRN
Qty: 30 TAB | Refills: 0 | Status: SHIPPED
Start: 2018-09-10 | End: 2018-10-08 | Stop reason: SDUPTHER

## 2018-09-10 NOTE — TELEPHONE ENCOUNTER
Was the patient seen in the last year in this department? Yes [facility]  8/6/18 #30    Does patient have an active prescription for medications requested? No     Received Request Via: Pharmacy

## 2018-09-11 ENCOUNTER — IMMUNIZATION (OUTPATIENT)
Dept: SOCIAL WORK | Facility: CLINIC | Age: 48
End: 2018-09-11
Payer: COMMERCIAL

## 2018-09-11 DIAGNOSIS — Z23 NEED FOR VACCINATION: ICD-10-CM

## 2018-09-11 PROCEDURE — 90471 IMMUNIZATION ADMIN: CPT | Performed by: REGISTERED NURSE

## 2018-09-11 PROCEDURE — 90686 IIV4 VACC NO PRSV 0.5 ML IM: CPT | Performed by: REGISTERED NURSE

## 2018-09-25 ENCOUNTER — IMMUNIZATION (OUTPATIENT)
Dept: SOCIAL WORK | Facility: CLINIC | Age: 48
End: 2018-09-25

## 2018-09-25 DIAGNOSIS — Z23 NEED FOR VACCINATION: ICD-10-CM

## 2018-10-08 DIAGNOSIS — F41.1 GAD (GENERALIZED ANXIETY DISORDER): ICD-10-CM

## 2018-10-08 RX ORDER — ALPRAZOLAM 0.5 MG/1
0.5 TABLET ORAL NIGHTLY PRN
Qty: 30 TAB | Refills: 0 | Status: SHIPPED
Start: 2018-10-08 | End: 2018-11-15 | Stop reason: SDUPTHER

## 2018-10-08 NOTE — TELEPHONE ENCOUNTER
Was the patient seen in the last year in this department? Yes [facility] 9/11/18 #30    Does patient have an active prescription for medications requested? No     Received Request Via: Pharmacy

## 2018-11-14 DIAGNOSIS — Z79.890 PREMATURE SURGICAL MENOPAUSE ON HRT: ICD-10-CM

## 2018-11-14 DIAGNOSIS — E89.40 PREMATURE SURGICAL MENOPAUSE ON HRT: ICD-10-CM

## 2018-11-14 RX ORDER — ESTERIFIED ESTROGEN AND METHYLTESTOSTERONE .625; 1.25 MG/1; MG/1
1 TABLET ORAL DAILY
Qty: 90 TAB | Refills: 3 | Status: SHIPPED
Start: 2018-11-14 | End: 2019-04-17 | Stop reason: SDUPTHER

## 2018-11-15 DIAGNOSIS — F41.1 GAD (GENERALIZED ANXIETY DISORDER): ICD-10-CM

## 2018-11-15 RX ORDER — ALPRAZOLAM 0.5 MG/1
0.5 TABLET ORAL NIGHTLY PRN
Qty: 30 TAB | Refills: 1 | Status: SHIPPED
Start: 2018-11-15 | End: 2019-01-03 | Stop reason: SDUPTHER

## 2018-11-16 ENCOUNTER — TELEPHONE (OUTPATIENT)
Dept: MEDICAL GROUP | Facility: LAB | Age: 48
End: 2018-11-16

## 2018-11-16 DIAGNOSIS — Z23 NEED FOR TDAP VACCINATION: Primary | ICD-10-CM

## 2018-11-16 NOTE — TELEPHONE ENCOUNTER
1. Caller Name: Louann                                Patient is on the MA Schedule 11/19/18 for Tdap vaccine/injection.    SPECIFIC Action To Be Taken: Orders pending, please sign.

## 2018-11-19 NOTE — TELEPHONE ENCOUNTER
I have placed the below orders and discussed them with Dr. Honeycutt. the MA is performing the below orders under the direction of Dr. DILLARD

## 2018-11-26 ENCOUNTER — APPOINTMENT (OUTPATIENT)
Dept: RADIOLOGY | Facility: MEDICAL CENTER | Age: 48
End: 2018-11-26
Attending: NURSE PRACTITIONER
Payer: COMMERCIAL

## 2018-12-13 ENCOUNTER — HOSPITAL ENCOUNTER (OUTPATIENT)
Dept: RADIOLOGY | Facility: MEDICAL CENTER | Age: 48
End: 2018-12-13
Attending: NURSE PRACTITIONER
Payer: COMMERCIAL

## 2018-12-13 DIAGNOSIS — Z12.31 VISIT FOR SCREENING MAMMOGRAM: ICD-10-CM

## 2018-12-13 PROCEDURE — 77067 SCR MAMMO BI INCL CAD: CPT

## 2018-12-14 DIAGNOSIS — R92.8 ABNORMAL MAMMOGRAM: ICD-10-CM

## 2018-12-19 ENCOUNTER — HOSPITAL ENCOUNTER (OUTPATIENT)
Dept: RADIOLOGY | Facility: MEDICAL CENTER | Age: 48
End: 2018-12-19
Attending: NURSE PRACTITIONER
Payer: COMMERCIAL

## 2018-12-19 DIAGNOSIS — R92.8 ABNORMAL MAMMOGRAM: ICD-10-CM

## 2018-12-19 PROCEDURE — 76642 ULTRASOUND BREAST LIMITED: CPT | Mod: LT

## 2018-12-19 PROCEDURE — 77065 DX MAMMO INCL CAD UNI: CPT | Mod: LT

## 2019-01-03 DIAGNOSIS — F41.1 GAD (GENERALIZED ANXIETY DISORDER): ICD-10-CM

## 2019-01-03 RX ORDER — ALPRAZOLAM 0.5 MG/1
0.5 TABLET ORAL NIGHTLY PRN
Qty: 30 TAB | Refills: 1 | Status: SHIPPED
Start: 2019-01-03 | End: 2019-02-28 | Stop reason: SDUPTHER

## 2019-01-03 NOTE — TELEPHONE ENCOUNTER
Was the patient seen in the last year in this department? Yes  12/12/18 w/ 1 refill    Does patient have an active prescription for medications requested? No     Received Request Via: Pharmacy

## 2019-02-07 ENCOUNTER — OFFICE VISIT (OUTPATIENT)
Dept: MEDICAL GROUP | Facility: LAB | Age: 49
End: 2019-02-07
Payer: COMMERCIAL

## 2019-02-07 VITALS
HEART RATE: 84 BPM | BODY MASS INDEX: 23.49 KG/M2 | TEMPERATURE: 98.5 F | RESPIRATION RATE: 12 BRPM | DIASTOLIC BLOOD PRESSURE: 82 MMHG | OXYGEN SATURATION: 98 % | HEIGHT: 65 IN | SYSTOLIC BLOOD PRESSURE: 130 MMHG | WEIGHT: 141 LBS

## 2019-02-07 DIAGNOSIS — E07.89 THYROID PAIN: ICD-10-CM

## 2019-02-07 DIAGNOSIS — E89.40 PREMATURE SURGICAL MENOPAUSE ON HRT: ICD-10-CM

## 2019-02-07 DIAGNOSIS — R10.30 LOWER ABDOMINAL PAIN: ICD-10-CM

## 2019-02-07 DIAGNOSIS — Z79.890 PREMATURE SURGICAL MENOPAUSE ON HRT: ICD-10-CM

## 2019-02-07 PROBLEM — J02.9 PHARYNGITIS: Status: RESOLVED | Noted: 2017-09-14 | Resolved: 2019-02-07

## 2019-02-07 PROCEDURE — 99214 OFFICE O/P EST MOD 30 MIN: CPT | Performed by: NURSE PRACTITIONER

## 2019-02-07 ASSESSMENT — PATIENT HEALTH QUESTIONNAIRE - PHQ9: CLINICAL INTERPRETATION OF PHQ2 SCORE: 0

## 2019-02-07 NOTE — PROGRESS NOTES
"CC  Abdominal pain    HPI  Anna is a 48-year-old established female here with complaint of a couple of things:     #1-abdominal pain: New issue to me today.  C/o frequent pain to right lower quadrant,heaviness near bladder, pulling near pubic bone (more with exercise), a pressure feeling near bladder / RLQ with bowel movements if she does not stay regular - duration is about one year.  Pain also occurs with quick movements - pulling sensation.  \"very painful to lower abdomen and back,\" when she wakes up to urinate in the middle of the night but not every night.  Hx of difficult hysterectomy many years ago - 12 years since.  Is concerned that she has a lot of scar tissue in her abdomen, decreasing her quality of life because of all previously mentioned pain.    #2-premature surgical menopause on hormone replacement therapy: She tells me that her pharmacist always scares her when she picks up her Estratest and estrogen so she started taking 1/2 estrogen 1 mg and 1/2 of an estratest  (down from whole pill of each) - has been having hot flashes / night sweats daily since.  No vaginal dryness. + more irritable.  She does not have a personal or family history of breast cancer.  She does not have ovaries or uterus.  She does not smoke and has never had a blood clot.  No personal history of hyperlipidemia.    #3- she is concerned because when she touches her neck or swallows, her thyroid feels sore.  She points to her mid anterior neck.  Denies pain with range of motion of her neck.  She does occasionally feel that something is stuck in her throat when she swallows.  No other associated symptoms such as unintentional weight changes, excessive fatigue or chronic constipation.  No personal history of thyroid disease.    Past medical, surgical, family, and social history is reviewed and updated in Epic chart by me today.   Medications and allergies reviewed and updated in Epic chart by me today.     ROS:   No n/v/d.  Denies " "dysuria or hematuria.  No hematochezia.  Denies unintentional weight loss.  No fever or chills.      Exam:  Blood pressure 130/82, pulse 84, temperature 36.9 °C (98.5 °F), resp. rate 12, height 1.651 m (5' 5\"), weight 64 kg (141 lb), SpO2 98 %, not currently breastfeeding.  Constitutional: Alert, no distress, plus 3 vital signs  Skin:  Warm, dry, no rashes invisible areas  Eye: Equal, round and reactive, conjunctiva clear  ENMT: Lips without lesions, good dentition, oropharynx clear    Neck: Trachea midline.  No masses or thyromegaly appreciated although she is tender to her just her anterior neck with palpation.  No significantly enlarged anterior cervical lymphadenopathy.  Respiratory: Unlabored respiration, lungs clear to auscultation, no wheezes, no rhonchi  Cardiovascular: Normal rate and rhythm, no murmur, no edema  Abdomen: Soft and nontender on exam today.  No masses felt.  Psych: Alert, pleasant, well-groomed, normal affect    Assessment / Plan / Medical Decision makin. Premature surgical menopause on HRT  -Discussed that she may go back to taking 1 mg of estradiol and her normal full dosage of Estratest until she talks to gynecology.  Counseled regarding the risk versus benefit of postmenopausal hormone replacement therapy such as the potential increased risk of breast and gynecological cancers as well as blood clots and she verbalized understanding.    2. Thyroid pain  -Recommend soft tissue ultrasound of the neck.  - US-SOFT TISSUES OF HEAD - NECK; Future    3. Lower abdominal pain  -She would like to see a surgeon.  I encouraged her to see a gynecological surgeon.  We discussed awaiting their opinion prior to ordering imaging.  Encouraged her to see Dr. Krishnamurthy and Dr. Mcdonald's office.    "

## 2019-02-26 ENCOUNTER — HOSPITAL ENCOUNTER (OUTPATIENT)
Dept: RADIOLOGY | Facility: MEDICAL CENTER | Age: 49
End: 2019-02-26
Attending: NURSE PRACTITIONER
Payer: COMMERCIAL

## 2019-02-26 DIAGNOSIS — E07.89 THYROID PAIN: ICD-10-CM

## 2019-02-26 PROCEDURE — 76536 US EXAM OF HEAD AND NECK: CPT

## 2019-02-28 DIAGNOSIS — F41.1 GAD (GENERALIZED ANXIETY DISORDER): ICD-10-CM

## 2019-02-28 RX ORDER — ALPRAZOLAM 0.5 MG/1
0.5 TABLET ORAL NIGHTLY PRN
Qty: 30 TAB | Refills: 2 | Status: SHIPPED
Start: 2019-02-28 | End: 2019-06-06 | Stop reason: SDUPTHER

## 2019-02-28 NOTE — TELEPHONE ENCOUNTER
Was the patient seen in the last year in this department? Yes LOV 2/7/19  1/7/19 #60 qty said 30 pt received 60   Is pt getting 30 or 60        Does patient have an active prescription for medications requested? No     Received Request Via: Pharmacy

## 2019-04-17 DIAGNOSIS — E89.40 PREMATURE SURGICAL MENOPAUSE ON HRT: ICD-10-CM

## 2019-04-17 DIAGNOSIS — Z79.890 PREMATURE SURGICAL MENOPAUSE ON HRT: ICD-10-CM

## 2019-04-17 RX ORDER — ESTERIFIED ESTROGEN AND METHYLTESTOSTERONE .625; 1.25 MG/1; MG/1
1 TABLET ORAL DAILY
Qty: 90 TAB | Refills: 3 | Status: SHIPPED
Start: 2019-04-17 | End: 2019-12-16

## 2019-04-17 NOTE — TELEPHONE ENCOUNTER
Was the patient seen in the last year in this department? Yes  2/7/19  11/16/18  Does patient have an active prescription for medications requested? No     Received Request Via: Pharmacy

## 2019-04-29 ENCOUNTER — OFFICE VISIT (OUTPATIENT)
Dept: MEDICAL GROUP | Facility: LAB | Age: 49
End: 2019-04-29
Payer: COMMERCIAL

## 2019-04-29 VITALS
WEIGHT: 137 LBS | TEMPERATURE: 98.4 F | DIASTOLIC BLOOD PRESSURE: 80 MMHG | RESPIRATION RATE: 12 BRPM | HEART RATE: 72 BPM | SYSTOLIC BLOOD PRESSURE: 112 MMHG | HEIGHT: 65 IN | OXYGEN SATURATION: 97 % | BODY MASS INDEX: 22.82 KG/M2

## 2019-04-29 DIAGNOSIS — E89.40 PREMATURE SURGICAL MENOPAUSE ON HRT: ICD-10-CM

## 2019-04-29 DIAGNOSIS — Z79.890 PREMATURE SURGICAL MENOPAUSE ON HRT: ICD-10-CM

## 2019-04-29 DIAGNOSIS — D22.9 ATYPICAL NEVI: ICD-10-CM

## 2019-04-29 DIAGNOSIS — Z23 NEED FOR TDAP VACCINATION: ICD-10-CM

## 2019-04-29 PROCEDURE — 90471 IMMUNIZATION ADMIN: CPT | Performed by: NURSE PRACTITIONER

## 2019-04-29 PROCEDURE — 90715 TDAP VACCINE 7 YRS/> IM: CPT | Performed by: NURSE PRACTITIONER

## 2019-04-29 PROCEDURE — 99213 OFFICE O/P EST LOW 20 MIN: CPT | Mod: 25 | Performed by: NURSE PRACTITIONER

## 2019-04-29 PROCEDURE — 17000 DESTRUCT PREMALG LESION: CPT | Performed by: NURSE PRACTITIONER

## 2019-04-29 RX ORDER — ESTRADIOL 1 MG/1
1 TABLET ORAL DAILY
Qty: 90 TAB | Refills: 3 | Status: SHIPPED | OUTPATIENT
Start: 2019-04-29 | End: 2020-03-11 | Stop reason: SDUPTHER

## 2019-04-29 NOTE — PROGRESS NOTES
"Chief Complaint   Patient presents with   • Nevus     right arm       HPI  Louann is a 48-year-old established female here with concern regarding a rapidly growing mole to her right upper arm, present for several months but over the past week she states it has become much more enlarged.  Denies any pain or bleeding to the mole area.  No other associated symptoms.  No personal history of skin cancer that she is aware of.    Postmenopausal hormone replacement therapy: Chronic issue.  Feels well with Estratest and an additional 1 mg estrogen.  She tells me that she did try cutting out her 1 mg estrogen but was up most of the night having night sweats and hot flashes.  She has had a total hysterectomy.  She denies a family history of breast cancer.  She is never had a blood clot and does not smoke.      Past medical, surgical, family, and social history is reviewed and updated in Epic chart by me today.   Medications and allergies reviewed and updated in Epic chart by me today.     ROS:   As documented in history of present illness above    Exam:  /80 (BP Location: Left arm, Patient Position: Sitting, BP Cuff Size: Adult)   Pulse 72   Temp 36.9 °C (98.4 °F)   Resp 12   Ht 1.651 m (5' 5\")   Wt 62.1 kg (137 lb)   SpO2 97%   Constitutional: Alert, no distress, plus 3 vital signs  Skin:  Warm, dry.  1 cm x 0.5 cm erythematous, raised and irregularly shaped nevi to right upper anterior arm.    Eye: Equal, round and reactive, conjunctiva clear  ENMT: Lips without lesions  Respiratory: Unlabored respiration, lungs clear to auscultation, no wheezes, no rhonchi  Cardiovascular: Normal rate  Psych: Alert, pleasant, well-groomed, normal affect    Procedure note: 1 lesion to her right upper arm was frozen with liquid nitrogen for approximately 3 to 5 seconds, 3 times.  The lesion was covered with a Band-Aid.  The patient tolerated extremely well, verbalizing minimal discomfort.  Discussed wound care " instructions.    Assessment / Plan / Medical Decision makin. Atypical nevi  -Discussed treatment options with the patient such as observation until July when she sees dermatology, freezing with liquid nitrogen or punch biopsy.  She preferred freezing with liquid nitrogen today because of rapid growth and will keep a close eye on this lesion.  She will send me pictures periodically via email as needed.  This was frozen with liquid nitrogen today.  Prior to freezing, I had the patient take a picture of the nevi to bring into her dermatology appointment in July.  Discussed that if the lesion does not go away with liquid nitrogen, that it needs to be removed, pending dermatology opinion.  Discussed wound care instructions when freezing a nevi with liquid nitrogen.  She will follow-up here with any onset purulent discharge or surrounding erythema.  She does plan on keeping her dermatology appointment for July.    2. Premature surgical menopause on HRT  -Counseled regarding the risk versus benefit of postmenopausal hormone replacement therapy such as the potential increased risk of breast and gynecological cancers as well as blood clots and she verbalized understanding.  - estradiol (ESTRACE) 1 MG Tab; Take 1 Tab by mouth every day.  Dispense: 90 Tab; Refill: 3    3. Need for Tdap vaccination  I have placed the below orders and discussed them with Dr. Honeycutt. the MA is performing the below orders under the direction of Dr. DILLARD  - Tdap =>8yo IM

## 2019-06-06 DIAGNOSIS — F41.1 GAD (GENERALIZED ANXIETY DISORDER): ICD-10-CM

## 2019-06-07 RX ORDER — ALPRAZOLAM 0.5 MG/1
0.5 TABLET ORAL NIGHTLY PRN
Qty: 30 TAB | Refills: 2 | Status: SHIPPED
Start: 2019-06-07 | End: 2019-09-09 | Stop reason: SDUPTHER

## 2019-06-07 NOTE — TELEPHONE ENCOUNTER
Was the patient seen in the last year in this department? Yes  4/29/19    5/9/19  Does patient have an active prescription for medications requested? No     Received Request Via: Pharmacy         Can this wait til Monday if not someone can call in.

## 2019-06-12 ENCOUNTER — HOSPITAL ENCOUNTER (OUTPATIENT)
Dept: RADIOLOGY | Facility: MEDICAL CENTER | Age: 49
End: 2019-06-12
Attending: OBSTETRICS & GYNECOLOGY
Payer: COMMERCIAL

## 2019-06-12 DIAGNOSIS — R10.2 ADNEXAL PAIN: ICD-10-CM

## 2019-06-12 PROCEDURE — 700117 HCHG RX CONTRAST REV CODE 255: Performed by: OBSTETRICS & GYNECOLOGY

## 2019-06-12 PROCEDURE — 74177 CT ABD & PELVIS W/CONTRAST: CPT

## 2019-06-12 RX ADMIN — IOHEXOL 100 ML: 350 INJECTION, SOLUTION INTRAVENOUS at 09:30

## 2019-06-12 RX ADMIN — IOHEXOL 25 ML: 240 INJECTION, SOLUTION INTRATHECAL; INTRAVASCULAR; INTRAVENOUS; ORAL at 09:30

## 2019-06-13 ENCOUNTER — HOSPITAL ENCOUNTER (OUTPATIENT)
Dept: RADIOLOGY | Facility: MEDICAL CENTER | Age: 49
End: 2019-06-13
Attending: NURSE PRACTITIONER
Payer: COMMERCIAL

## 2019-06-13 DIAGNOSIS — R92.8 ABNORMAL FINDING ON RADIOLOGICAL EXAMINATION OF BREAST: ICD-10-CM

## 2019-06-13 PROCEDURE — 76642 ULTRASOUND BREAST LIMITED: CPT | Mod: LT

## 2019-06-14 ENCOUNTER — HOSPITAL ENCOUNTER (OUTPATIENT)
Dept: RADIOLOGY | Facility: MEDICAL CENTER | Age: 49
End: 2019-06-14
Attending: NURSE PRACTITIONER
Payer: COMMERCIAL

## 2019-06-14 DIAGNOSIS — R92.8 ABNORMAL FINDING ON BREAST IMAGING: ICD-10-CM

## 2019-06-14 LAB — PATHOLOGY CONSULT NOTE: NORMAL

## 2019-06-14 PROCEDURE — 19083 BX BREAST 1ST LESION US IMAG: CPT

## 2019-06-14 PROCEDURE — 88305 TISSUE EXAM BY PATHOLOGIST: CPT

## 2019-06-17 ENCOUNTER — TELEPHONE (OUTPATIENT)
Dept: RADIOLOGY | Facility: MEDICAL CENTER | Age: 49
End: 2019-06-17

## 2019-09-09 DIAGNOSIS — F41.1 GAD (GENERALIZED ANXIETY DISORDER): ICD-10-CM

## 2019-09-09 NOTE — TELEPHONE ENCOUNTER
Was the patient seen in the last year in this department? Yes  4/29/19  8/7/19  Does patient have an active prescription for medications requested? No     Received Request Via: Pharmacy

## 2019-09-10 RX ORDER — ALPRAZOLAM 0.5 MG/1
0.5 TABLET ORAL NIGHTLY PRN
Qty: 30 TAB | Refills: 2 | Status: SHIPPED
Start: 2019-09-10 | End: 2019-12-12 | Stop reason: SDUPTHER

## 2019-09-30 ENCOUNTER — IMMUNIZATION (OUTPATIENT)
Dept: SOCIAL WORK | Facility: CLINIC | Age: 49
End: 2019-09-30
Payer: COMMERCIAL

## 2019-09-30 DIAGNOSIS — Z23 NEED FOR VACCINATION: ICD-10-CM

## 2019-09-30 PROCEDURE — 90471 IMMUNIZATION ADMIN: CPT | Performed by: REGISTERED NURSE

## 2019-09-30 PROCEDURE — 90686 IIV4 VACC NO PRSV 0.5 ML IM: CPT | Performed by: REGISTERED NURSE

## 2019-12-12 DIAGNOSIS — F41.1 GAD (GENERALIZED ANXIETY DISORDER): ICD-10-CM

## 2019-12-12 RX ORDER — ALPRAZOLAM 0.5 MG/1
0.5 TABLET ORAL NIGHTLY PRN
Qty: 30 TAB | Refills: 2 | Status: SHIPPED
Start: 2019-12-12 | End: 2019-12-16 | Stop reason: SDUPTHER

## 2019-12-16 ENCOUNTER — OFFICE VISIT (OUTPATIENT)
Dept: MEDICAL GROUP | Facility: LAB | Age: 49
End: 2019-12-16
Payer: COMMERCIAL

## 2019-12-16 VITALS
OXYGEN SATURATION: 98 % | DIASTOLIC BLOOD PRESSURE: 82 MMHG | SYSTOLIC BLOOD PRESSURE: 118 MMHG | BODY MASS INDEX: 22.66 KG/M2 | RESPIRATION RATE: 12 BRPM | HEART RATE: 78 BPM | TEMPERATURE: 98.6 F | WEIGHT: 136 LBS | HEIGHT: 65 IN

## 2019-12-16 DIAGNOSIS — M19.049 LOCALIZED OSTEOARTHRITIS OF HAND, UNSPECIFIED LATERALITY: ICD-10-CM

## 2019-12-16 DIAGNOSIS — M79.671 RIGHT FOOT PAIN: ICD-10-CM

## 2019-12-16 DIAGNOSIS — Z79.899 CHRONIC PRESCRIPTION BENZODIAZEPINE USE: ICD-10-CM

## 2019-12-16 DIAGNOSIS — F41.1 GAD (GENERALIZED ANXIETY DISORDER): ICD-10-CM

## 2019-12-16 PROCEDURE — 99214 OFFICE O/P EST MOD 30 MIN: CPT | Performed by: NURSE PRACTITIONER

## 2019-12-16 RX ORDER — ALPRAZOLAM 0.5 MG/1
0.5 TABLET ORAL NIGHTLY PRN
Qty: 90 TAB | Refills: 1 | Status: SHIPPED
Start: 2019-12-16 | End: 2020-03-11 | Stop reason: SDUPTHER

## 2019-12-16 NOTE — PROGRESS NOTES
"Chief Complaint   Patient presents with   • Anxiety     med refill    • Arthritis       HPI  Kisha is a 49-year-old established female here with a couple of issues:    #1- generalized anxiety disorder: Chronic issue.  Much more prevalent at night.  Tells me that as soon as the sun goes down, she excessively worries.  She was placed on Xanax prior to bedtime by Dr. Gramajo many years ago and this has always worked well for her.  Denies any negative side effects of Xanax.  Has 2 teenage children and prefers to refrain from medications for sleep such as Ambien, as this may decrease her ability to respond to emergencies.    #2-arthritis: Chronic issue.  More so in her hands and feet.  Would like to know if there is a diet that she could follow that may help with her arthritis.  Mom / dad with OA.  PGM - RA. Pt neg RF 2018.    Past medical, surgical, family, and social history is reviewed and updated in Epic chart by me today.   Medications and allergies reviewed and updated in Epic chart by me today.     ROS:   As documented in history of present illness above    Exam:  /82 (BP Location: Left arm, Patient Position: Sitting, BP Cuff Size: Adult)   Pulse 78   Temp 37 °C (98.6 °F)   Resp 12   Ht 1.651 m (5' 5\")   Wt 61.7 kg (136 lb)   SpO2 98%   Constitutional: Alert, no distress, plus 3 vital signs  Skin:  Warm, dry, no rashes invisible areas  Eye: Equal, round and reactive, conjunctiva clear  ENMT: Lips without lesions  Respiratory: Unlabored respiration  Cardiovascular: Normal rate and rhythm  Psych: Alert, pleasant, well-groomed, normal affect  Musculoskeletal: She does not have erythema or notable swelling to any of her hand joints.  She does have a prominence that is nontender and nonerythematous to the dorsal aspect of her foot, proximal metatarsal area.    Assessment / Plan / Medical Decision makin. PEG (generalized anxiety disorder)  -Stable with chronic nighttime alprazolam use.  Offered her " other options which she declines.  She understands the potential for chemical dependency on alprazolam.  She understands not to mix alprazolam and alcohol within 6 to 8 hours of each other.  - ALPRAZolam (XANAX) 0.5 MG Tab; Take 1 Tab by mouth at bedtime as needed for Sleep for up to 90 days.  Dispense: 90 Tab; Refill: 1    2. Localized osteoarthritis of hand, unspecified laterality  -Trial of topical diclofenac up to 4 times daily.  Encouraged a plant-based diet.  Discussed anti-inflammatory foods with her in depth.  - Diclofenac Sodium 1 % Gel; Apply 1 g to skin as directed 4 times a day.  Dispense: 5 Tube; Refill: 3    3. Right foot pain  -As above.  Offered referral to see orthopedics which she is not interested in at this time.  - Diclofenac Sodium 1 % Gel; Apply 1 g to skin as directed 4 times a day.  Dispense: 5 Tube; Refill: 3    4. Chronic prescription benzodiazepine use  -In prescribing controlled substances to this patient, I certify that I have obtained and reviewed the medical history of Louann Infante. I have also made a good eyad effort to obtain applicable records from other providers who have treated the patient and records did not demonstrate any increased risk of substance abuse that would prevent me from prescribing controlled substances.     I have conducted a physical exam and documented it. I have reviewed Ms. Infante’s prescription history as maintained by the [place] Prescription Monitoring Program.     I have assessed the patient’s risk for abuse, dependency, and addiction using the validated Opioid Risk Tool available at https://www.mdcalc.com/egtweg-fqmp-krje-ort-narcotic-abuse.     Given the above, I believe the benefits of controlled substance therapy outweigh the risks as patient has been stable on nighttime Xanax use for many years    - Controlled Substance Treatment Agreement

## 2020-02-10 ENCOUNTER — APPOINTMENT (OUTPATIENT)
Dept: RADIOLOGY | Facility: MEDICAL CENTER | Age: 50
End: 2020-02-10
Attending: NURSE PRACTITIONER
Payer: COMMERCIAL

## 2020-02-11 ENCOUNTER — APPOINTMENT (RX ONLY)
Dept: URBAN - METROPOLITAN AREA CLINIC 31 | Facility: CLINIC | Age: 50
Setting detail: DERMATOLOGY
End: 2020-02-11

## 2020-02-11 DIAGNOSIS — Z71.89 OTHER SPECIFIED COUNSELING: ICD-10-CM

## 2020-02-11 DIAGNOSIS — L82.1 OTHER SEBORRHEIC KERATOSIS: ICD-10-CM

## 2020-02-11 DIAGNOSIS — D22 MELANOCYTIC NEVI: ICD-10-CM

## 2020-02-11 PROBLEM — D48.5 NEOPLASM OF UNCERTAIN BEHAVIOR OF SKIN: Status: ACTIVE | Noted: 2020-02-11

## 2020-02-11 PROCEDURE — 99202 OFFICE O/P NEW SF 15 MIN: CPT | Mod: 25

## 2020-02-11 PROCEDURE — 11104 PUNCH BX SKIN SINGLE LESION: CPT

## 2020-02-11 PROCEDURE — ? COUNSELING

## 2020-02-11 PROCEDURE — ? BIOPSY BY PUNCH METHOD

## 2020-02-11 PROCEDURE — ? OBSERVATION AND MEASURE

## 2020-02-11 ASSESSMENT — LOCATION DETAILED DESCRIPTION DERM
LOCATION DETAILED: LEFT DISTAL POSTERIOR UPPER ARM
LOCATION DETAILED: LEFT PROXIMAL PRETIBIAL REGION

## 2020-02-11 ASSESSMENT — LOCATION SIMPLE DESCRIPTION DERM
LOCATION SIMPLE: LEFT PRETIBIAL REGION
LOCATION SIMPLE: LEFT POSTERIOR UPPER ARM

## 2020-02-11 ASSESSMENT — LOCATION ZONE DERM
LOCATION ZONE: ARM
LOCATION ZONE: LEG

## 2020-02-11 NOTE — PROCEDURE: BIOPSY BY PUNCH METHOD
Detail Level: Detailed
Was A Bandage Applied: Yes
Punch Size In Mm: 5
Biopsy Type: H and E
Anesthesia Type: 1% lidocaine with epinephrine
Anesthesia Volume In Cc: 0.5
Additional Anesthesia Volume In Cc (Will Not Render If 0): 0
Hemostasis: Electrocautery
Epidermal Sutures: 4-0 Nylon
Number Of Epidermal Sutures (Optional): 2
Wound Care: Petrolatum
Dressing: bandage
Suture Removal: 12 days
Patient Will Remove Sutures At Home?: No
Lab: [id number]
Lab Facility: 
Consent: Written consent was obtained and risks were reviewed including but not limited to scarring, infection, bleeding, scabbing, incomplete removal, nerve damage and allergy to anesthesia.
Post-Care Instructions: I reviewed with the patient in detail post-care instructions. Patient is to keep the biopsy site dry overnight, and then apply bacitracin twice daily until healed. Patient may apply hydrogen peroxide soaks to remove any crusting.
Home Suture Removal Text: Patient was provided a home suture removal kit and will remove their sutures at home.  If they have any questions or difficulties they will call the office.
Notification Instructions: Patient will be notified of biopsy results. However, patient instructed to call the office if not contacted within 2 weeks.
Billing Type: Third-Party Bill

## 2020-02-12 ENCOUNTER — HOSPITAL ENCOUNTER (OUTPATIENT)
Dept: RADIOLOGY | Facility: MEDICAL CENTER | Age: 50
End: 2020-02-12
Attending: NURSE PRACTITIONER
Payer: COMMERCIAL

## 2020-02-12 DIAGNOSIS — Z98.890 S/P BREAST BIOPSY, LEFT: ICD-10-CM

## 2020-02-12 PROCEDURE — G0279 TOMOSYNTHESIS, MAMMO: HCPCS

## 2020-02-12 PROCEDURE — 76642 ULTRASOUND BREAST LIMITED: CPT | Mod: LT

## 2020-02-24 ENCOUNTER — APPOINTMENT (RX ONLY)
Dept: URBAN - METROPOLITAN AREA CLINIC 31 | Facility: CLINIC | Age: 50
Setting detail: DERMATOLOGY
End: 2020-02-24

## 2020-02-24 DIAGNOSIS — Z48.02 ENCOUNTER FOR REMOVAL OF SUTURES: ICD-10-CM

## 2020-02-24 PROCEDURE — ? COUNSELING

## 2020-02-24 PROCEDURE — ? SUTURE REMOVAL (GLOBAL PERIOD)

## 2020-02-24 ASSESSMENT — LOCATION DETAILED DESCRIPTION DERM: LOCATION DETAILED: LEFT DISTAL POSTERIOR UPPER ARM

## 2020-02-24 ASSESSMENT — LOCATION ZONE DERM: LOCATION ZONE: ARM

## 2020-02-24 ASSESSMENT — LOCATION SIMPLE DESCRIPTION DERM: LOCATION SIMPLE: LEFT POSTERIOR UPPER ARM

## 2020-02-24 NOTE — PROCEDURE: SUTURE REMOVAL (GLOBAL PERIOD)
Detail Level: Detailed
Add 80852 Cpt? (Important Note: In 2017 The Use Of 01058 Is Being Tracked By Cms To Determine Future Global Period Reimbursement For Global Periods): no

## 2020-03-05 ENCOUNTER — APPOINTMENT (OUTPATIENT)
Dept: MEDICAL GROUP | Facility: LAB | Age: 50
End: 2020-03-05
Payer: COMMERCIAL

## 2020-03-06 ENCOUNTER — NON-PROVIDER VISIT (OUTPATIENT)
Dept: MEDICAL GROUP | Facility: LAB | Age: 50
End: 2020-03-06
Payer: COMMERCIAL

## 2020-03-06 DIAGNOSIS — Z11.1 SCREENING FOR TUBERCULOSIS: ICD-10-CM

## 2020-03-06 NOTE — PROGRESS NOTES
Kisha Infante is a 49 y.o. female here for a non-provider visit for PPD placement -- Step 1 of 1    Reason for PPD:  work requirement    1. TB evaluation questionnaire completed by patient? Yes      -  If any answers marked yes did you contact a provider prior to placing? Not Indicated  2.  Patient notified to return to clinic for reading on: Monday before noon   3.  PPD Placement documentation completed on TB evaluation questionnaire? Yes  4.  Location of TB evaluation questionnaire filed: TB folder/clipboard in C[facility] room on [facility].     NDC: 42807-800-65  LOT:[id number]  EXP:05/22/2022

## 2020-03-09 ENCOUNTER — NON-PROVIDER VISIT (OUTPATIENT)
Dept: MEDICAL GROUP | Facility: LAB | Age: 50
End: 2020-03-09
Payer: COMMERCIAL

## 2020-03-09 LAB — TB WHEAL 3D P 5 TU DIAM: NORMAL MM

## 2020-03-09 PROCEDURE — 86580 TB INTRADERMAL TEST: CPT | Performed by: NURSE PRACTITIONER

## 2020-03-09 NOTE — PROGRESS NOTES
Kisha Infante is a 49 y.o. female here for a non-provider visit for PPD reading -- Step 1 of 1.      1.  Resulted in Epic under enter/edit results? Yes   2.  TB evaluation questionnaire scanned into chart and original given to patient?Yes      3. Was induration greater than 0 mm? No.      Routed to PCP? Yes

## 2020-03-11 DIAGNOSIS — Z79.890 PREMATURE SURGICAL MENOPAUSE ON HRT: ICD-10-CM

## 2020-03-11 DIAGNOSIS — B00.9 HERPES SIMPLEX: ICD-10-CM

## 2020-03-11 DIAGNOSIS — E89.40 PREMATURE SURGICAL MENOPAUSE ON HRT: ICD-10-CM

## 2020-03-11 DIAGNOSIS — F41.1 GAD (GENERALIZED ANXIETY DISORDER): ICD-10-CM

## 2020-03-11 RX ORDER — ALPRAZOLAM 0.5 MG/1
0.5 TABLET ORAL NIGHTLY PRN
Qty: 90 TAB | Refills: 0 | Status: SHIPPED
Start: 2020-03-11 | End: 2020-06-09 | Stop reason: SDUPTHER

## 2020-03-11 RX ORDER — ESTRADIOL 1 MG/1
1 TABLET ORAL DAILY
Qty: 90 TAB | Refills: 3 | Status: SHIPPED | OUTPATIENT
Start: 2020-03-11 | End: 2021-05-11

## 2020-03-11 RX ORDER — ACYCLOVIR 400 MG/1
400 TABLET ORAL 3 TIMES DAILY PRN
Qty: 36 TAB | Refills: 3 | Status: SHIPPED | OUTPATIENT
Start: 2020-03-11 | End: 2021-11-12 | Stop reason: SDUPTHER

## 2020-03-11 NOTE — TELEPHONE ENCOUNTER
----- Message from Louann Infante sent at 3/11/2020 10:58 AM PDT -----  Regarding: RE: Prescription Question  Contact: [phone]  Hello, alprazolam, & Estratest, Acyclovir  Thank you

## 2020-03-11 NOTE — TELEPHONE ENCOUNTER
Received request via: Patient    Was the patient seen in the last year in this department? Yes  12/16/2019  Does the patient have an active prescription (recently filled or refills available) for medication(s) requested? No    2/13/2020 #30

## 2020-06-09 DIAGNOSIS — F41.1 GAD (GENERALIZED ANXIETY DISORDER): ICD-10-CM

## 2020-06-09 RX ORDER — ALPRAZOLAM 0.5 MG/1
0.5 TABLET ORAL NIGHTLY PRN
Qty: 90 TAB | Refills: 0 | Status: SHIPPED | OUTPATIENT
Start: 2020-06-09 | End: 2020-09-01 | Stop reason: SDUPTHER

## 2020-06-09 NOTE — TELEPHONE ENCOUNTER
Received request via: Pharmacy    3/11/20  Was the patient seen in the last year in this department? Yes  12/16/19  Does the patient have an active prescription (recently filled or refills available) for medication(s) requested? No

## 2020-07-14 ENCOUNTER — OFFICE VISIT (OUTPATIENT)
Dept: MEDICAL GROUP | Facility: LAB | Age: 50
End: 2020-07-14
Payer: COMMERCIAL

## 2020-07-14 ENCOUNTER — HOSPITAL ENCOUNTER (OUTPATIENT)
Dept: RADIOLOGY | Facility: MEDICAL CENTER | Age: 50
End: 2020-07-14
Attending: NURSE PRACTITIONER
Payer: COMMERCIAL

## 2020-07-14 VITALS
HEIGHT: 65 IN | TEMPERATURE: 98.1 F | OXYGEN SATURATION: 100 % | WEIGHT: 137 LBS | RESPIRATION RATE: 14 BRPM | SYSTOLIC BLOOD PRESSURE: 112 MMHG | HEART RATE: 70 BPM | DIASTOLIC BLOOD PRESSURE: 80 MMHG | BODY MASS INDEX: 22.82 KG/M2

## 2020-07-14 DIAGNOSIS — M25.551 BILATERAL HIP PAIN: ICD-10-CM

## 2020-07-14 DIAGNOSIS — M54.30 SCIATIC LEG PAIN: ICD-10-CM

## 2020-07-14 DIAGNOSIS — M25.552 BILATERAL HIP PAIN: ICD-10-CM

## 2020-07-14 PROCEDURE — 99214 OFFICE O/P EST MOD 30 MIN: CPT | Performed by: NURSE PRACTITIONER

## 2020-07-14 PROCEDURE — 72100 X-RAY EXAM L-S SPINE 2/3 VWS: CPT

## 2020-07-14 PROCEDURE — 73521 X-RAY EXAM HIPS BI 2 VIEWS: CPT

## 2020-07-14 RX ORDER — MELOXICAM 15 MG/1
15 TABLET ORAL DAILY
Qty: 30 TAB | Refills: 0 | Status: SHIPPED | OUTPATIENT
Start: 2020-07-14 | End: 2023-02-21

## 2020-07-14 ASSESSMENT — PATIENT HEALTH QUESTIONNAIRE - PHQ9: CLINICAL INTERPRETATION OF PHQ2 SCORE: 0

## 2020-07-14 NOTE — PROGRESS NOTES
"CC  Bilateral hip and right leg pain    HPI  Kisha is a 49 yo est female here with c/o new onset bilateral hip pain, right hip hurts more than left.  Right hip pain started 1/2020 and left started a few weeks ago.  + buttocks spasms with walking greater than a couple of minutes.  Right hip pain radiates to anterior right thigh to inside of knee and down back of calf.  Denies any injuries or traumas.  No history of the same.  No other associated symptoms.  Has tried a chiropractor which has not helped.    Spins for exercise - stopped 3 weeks ago and is walking but hurts as well.   Buttocks and hips hurt at night in any position in bed.   Pain is minimal in chairs with exception of long periods of time - right foot feels numb.  Standing still is the most comfortable.   Denies back pain.    Taking aleve twice daily for a week without improvement.     Past medical, surgical, family, and social history is reviewed and updated in Epic chart by me today.   Medications and allergies reviewed and updated in Epic chart by me today.     ROS:   As documented in history of present illness above    Exam:  /80 (BP Location: Left arm, Patient Position: Sitting, BP Cuff Size: Adult)   Pulse 70   Temp 36.7 °C (98.1 °F)   Resp 14   Ht 1.651 m (5' 5\")   Wt 62.1 kg (137 lb)   SpO2 100%   Constitutional: Alert, no distress, plus 3 vital signs  Skin:  Warm, dry, no rashes invisible areas  Eye: Equal, round and reactive, conjunctiva clear  ENMT: Lips without lesions, good dentition, oropharynx clear    Neck: Trachea midline, no masses, no thyromegaly  Respiratory: Unlabored respiration, lungs clear to auscultation, no wheezes, no rhonchi  Cardiovascular: Normal rate and rhythm  Musculoskeletal: She does have tenderness to palpation to her right acetabulur region as well as proximal IT band.  She does not have any right knee discomfort on palpation.  She is able to passively dorsi and plantarflex both feet.  Negative straight " leg raise bilaterally.  She experiences right hip pain with active extension of her right leg across her body in a supine position.  Psych: Alert, pleasant, well-groomed, normal affect    Assessment / Plan / Medical Decision makin. Bilateral hip pain  meloxicam (MOBIC) 15 MG tablet    REFERRAL TO PHYSICAL THERAPY Reason for Therapy: Eval/Treat/Report    DX-HIP-BILATERAL-WITH PELVIS-2 VIEWS   2. Sciatic leg pain  meloxicam (MOBIC) 15 MG tablet    REFERRAL TO PHYSICAL THERAPY Reason for Therapy: Eval/Treat/Report    DX-LUMBAR SPINE-2 OR 3 VIEWS   Recommend x-rays of her lumbar spine, pelvis and hips.  Started on meloxicam 15 mg every day with food.  Discussed GI and renal precautions with meloxicam.  She will avoid all other NSAIDs while taking meloxicam.  We discussed topical anti-inflammatories, stretches, ice versus heat.  Discussed differential diagnoses with her.  Also briefly discussed orthopedics versus physiatry consults when she returns from her trip.  She is leaving next Thursday for a long hiking trip and understands that she may not able to do this.  I did put in an urgent referral to physical therapy in hopes of getting into sessions prior to her camping trip next weekend.  We will follow-up via email regarding x-rays, efficacy of meloxicam and how she is doing prior to her trip departure next Thursday.

## 2020-07-16 ENCOUNTER — PHYSICAL THERAPY (OUTPATIENT)
Dept: PHYSICAL THERAPY | Facility: REHABILITATION | Age: 50
End: 2020-07-16
Attending: NURSE PRACTITIONER
Payer: COMMERCIAL

## 2020-07-16 DIAGNOSIS — M25.551 BILATERAL HIP PAIN: ICD-10-CM

## 2020-07-16 DIAGNOSIS — M25.552 BILATERAL HIP PAIN: ICD-10-CM

## 2020-07-16 DIAGNOSIS — M54.30 SCIATIC LEG PAIN: ICD-10-CM

## 2020-07-16 PROCEDURE — 97110 THERAPEUTIC EXERCISES: CPT

## 2020-07-16 PROCEDURE — 97162 PT EVAL MOD COMPLEX 30 MIN: CPT

## 2020-07-16 ASSESSMENT — ENCOUNTER SYMPTOMS
EXACERBATED BY: SLEEPING
QUALITY: HOT
QUALITY: SHARP
PAIN SCALE AT LOWEST: 0
PAIN SCALE AT HIGHEST: 7
QUALITY: RADIATING
PAIN TIMING: UNABLE TO SPECIFY
EXACERBATED BY: SITTING
EXACERBATED BY: SQUATTING
EXACERBATED BY: WALKING
PAIN SCALE: 5

## 2020-07-16 NOTE — OP THERAPY EVALUATION
Outpatient Physical Therapy  INITIAL EVALUATION    [facility] [place] [id number], [address]  [place] [address]  Phone:  [phone]    Date of Evaluation: 2020    Patient: Kisha Infante  YOB: 1970  MRN: [id number]     Referring Provider: ELMER Donohue  [address]  [place] [address]   Referring Diagnosis Bilateral hip pain [M25.551, M25.552];Sciatic leg pain [M54.30]     Time Calculation    Start time: 1330  Stop time: 1430 Time Calculation (min): 60 minutes         Chief Complaint: No chief complaint on file.    Visit Diagnoses     ICD-10-CM   1. Bilateral hip pain  M25.551    M25.552   2. Sciatic leg pain  M54.30         Subjective:   History of Present Illness:     Mechanism of injury:  Onset of insidious back and bilateral hip pain since January.  Increased right glute , groin, anterior thigh lateral LE right foot N/T, weakness right leg.  Made worse with hot yoga-bending.  Been seeing chiro 3 x 3 weeks.   Aggs:  Walking , lying in bed, prolonged sitting   Interrupted sleep several times a night  Better:  Lying on stomach   Symptoms are getting worse remain intermittent   Right sided paresthesia   - cough sneeze, - bladder retention  Exercise: walking level surfaces    PMHX: not significant, migraines  Previous episodes:  No       XRAY hips  Unremarkable examination of the bilateral hips.  X ray lumbar  Unremarkable lumbar spine series.    Prior level of function:  Hiker, Yogi, spinning several times a week  Sleep disturbance:  Interrupted sleep  Pain:     Current pain ratin    At best pain ratin    At worst pain ratin    Location:  Posterior glute/ posterior thigh and lateral leg    Quality:  Hot, sharp and radiating (electric/zinging)    Pain timing:  Unable to specify    Alleviating factors: temporary chiropractic, ice bath.    Aggravating factors:  Squatting, sitting, sleeping and walking    Progression:   Stable  Diagnostic Tests:     None    Treatments:     Previous treatment:  Chiropractic    Current treatment:  Chiropractic      Past Medical History:   Diagnosis Date   • Migraine    • Pharyngitis 9/14/2017   • Sleep disorder      Past Surgical History:   Procedure Laterality Date   • ABDOMINAL HYSTERECTOMY TOTAL  2006    bleeding,vaginal   • CHOLECYSTECTOMY  1997   • PRIMARY C SECTION      x2     Social History     Tobacco Use   • Smoking status: Never Smoker   • Smokeless tobacco: Never Used   Substance Use Topics   • Alcohol use: Yes     Comment: occas     Family and Occupational History     Socioeconomic History   • Marital status:      Spouse name: Not on file   • Number of children: Not on file   • Years of education: Not on file   • Highest education level: Not on file   Occupational History   • Not on file       Objective     Neurological Testing     Myotome testing   Lumbar (left)   All left lumbar myotomes within normal limits    Lumbar (right)   L2 (hip flexors): 5  L3 (knee extensors): 4+  L4 (ankle dorsiflexors): 4+  L5 (great toe extension): 5  S1 (ankle plantar flexors): 5    Dermatome testing   Lumbar (right)   L3: painful  L4: painful  L5: painful  S1: painful  S2: painful    Active Range of Motion     Additional Active Range of Motion Details  FF: WNL//increaseLeg  Backward BEND:  Increase back/decrease leg/NB lumbar//decrease  SG R: WNL//pinch lumbar  SG L:  WNL//pinch lumbar  Prone Lying: glute pain   EIL: increase anterior thigh//no worse  SEIL centralizing//B  REIL centralizing//NB  REIL with clinician op: centralizing/better    Tests     Additional Tests Details  Increase weakness/posterior leg pain @50 degrees SLR Right        Therapeutic Exercises (CPT 10957):     1. SEIL, 5 min, centralizing//better    2. REIL, 2 x 10    3. Posture re ed lumbar roll with driving and sitting    Therapeutic Treatments and Modalities:     1. Manual Therapy (CPT 85348), clinician overpressure with REIL  and prone 2 x 10    Time-based treatments/modalities:    Physical Therapy Timed Treatment Charges  Manual therapy minutes (CPT 90965): 5 minutes  Therapeutic exercise minutes (CPT 77345): 10 minutes      Assessment, Response and Plan:   Assessment details:  Patient presents with Lumbar derangement responding to sustained extension with centralising symptoms.  Patient demonstrates right myotomal weakness L3/L4, and + SLR .  Patient symptoms limit her ability to hike, sit, bend and perform sustained postures in yoga.  Recommend continued PT to meet goals stated below.   Barriers to therapy:  None  Prognosis: good        Functional Assessment Used  Abran Samson Low Back Pain and Disability Score: 37.5     Referring provider co-signature:  I have reviewed this plan of care and my co-signature certifies the need for services.    Certification Period: 07/16/2020 to  09/10/20    Physician Signature: ________________________________ Date: ______________

## 2020-07-20 ENCOUNTER — APPOINTMENT (OUTPATIENT)
Dept: PHYSICAL THERAPY | Facility: REHABILITATION | Age: 50
End: 2020-07-20
Attending: NURSE PRACTITIONER
Payer: COMMERCIAL

## 2020-07-30 ENCOUNTER — PHYSICAL THERAPY (OUTPATIENT)
Dept: PHYSICAL THERAPY | Facility: REHABILITATION | Age: 50
End: 2020-07-30
Attending: NURSE PRACTITIONER
Payer: COMMERCIAL

## 2020-07-30 DIAGNOSIS — M54.30 SCIATIC LEG PAIN: ICD-10-CM

## 2020-07-30 DIAGNOSIS — M25.552 BILATERAL HIP PAIN: ICD-10-CM

## 2020-07-30 DIAGNOSIS — M25.551 BILATERAL HIP PAIN: ICD-10-CM

## 2020-07-30 PROCEDURE — 97140 MANUAL THERAPY 1/> REGIONS: CPT

## 2020-07-30 PROCEDURE — 97014 ELECTRIC STIMULATION THERAPY: CPT

## 2020-07-30 PROCEDURE — 97110 THERAPEUTIC EXERCISES: CPT

## 2020-07-30 NOTE — OP THERAPY DAILY TREATMENT
Outpatient Physical Therapy  DAILY TREATMENT     [facility] [place] [address], [address]  [place] [address]  Phone:  [phone]    Date: 07/30/2020    Patient: Kisha Infante  YOB: 1970  MRN: [id number]     Time Calculation    Start time: 0930  Stop time: 1020 Time Calculation (min): 50 minutes         Chief Complaint: No chief complaint on file.    Visit #: 2    SUBJECTIVE:  Better, still unable to walk/hike uphill    OBJECTIVE:  Current objective measures:           Therapeutic Exercises (CPT 39409):     1. SEIL, 5 min, centralizing//better    2. REIL, 2 x 10    3. Posture re ed lumbar roll with driving and sitting    4. Ball squat hip hinge, x 20    5. Bridge on ball, 10 x 15 sec    6. Ski jumper, 2 x 30 sec    Therapeutic Treatments and Modalities:     1. Manual Therapy (CPT 11757), clinician overpressure with REIL and prone 2 x 10    2. E Stim Unattended (CPT 93714), IFC with MHP to lumbar spine in SEIL x 15 min    Time-based treatments/modalities:    Physical Therapy Timed Treatment Charges  Manual therapy minutes (CPT 64949): 10 minutes  Therapeutic exercise minutes (CPT 25100): 18 minutes        ASSESSMENT:   Response to treatment: abolish right glute pain with REIL with clinician op, discussed easing back into yoga and avoiding prolonged lumbar flexion.  Patient is still unable to hike secondary to aggravation of leg symptoms with uphill walking.     PLAN/RECOMMENDATIONS:   Plan for treatment: therapy treatment to continue next visit.  Planned interventions for next visit: continue with current treatment.

## 2020-08-03 ENCOUNTER — PHYSICAL THERAPY (OUTPATIENT)
Dept: PHYSICAL THERAPY | Facility: REHABILITATION | Age: 50
End: 2020-08-03
Attending: NURSE PRACTITIONER
Payer: COMMERCIAL

## 2020-08-03 DIAGNOSIS — M54.30 SCIATIC LEG PAIN: ICD-10-CM

## 2020-08-03 DIAGNOSIS — M25.551 BILATERAL HIP PAIN: ICD-10-CM

## 2020-08-03 DIAGNOSIS — M25.552 BILATERAL HIP PAIN: ICD-10-CM

## 2020-08-03 PROCEDURE — 97110 THERAPEUTIC EXERCISES: CPT

## 2020-08-03 PROCEDURE — 97112 NEUROMUSCULAR REEDUCATION: CPT

## 2020-08-06 ENCOUNTER — APPOINTMENT (OUTPATIENT)
Dept: PHYSICAL THERAPY | Facility: REHABILITATION | Age: 50
End: 2020-08-06
Attending: NURSE PRACTITIONER
Payer: COMMERCIAL

## 2020-09-01 DIAGNOSIS — F41.1 GAD (GENERALIZED ANXIETY DISORDER): ICD-10-CM

## 2020-09-01 RX ORDER — ALPRAZOLAM 0.5 MG/1
0.5 TABLET ORAL NIGHTLY PRN
Qty: 90 TAB | Refills: 0 | Status: SHIPPED | OUTPATIENT
Start: 2020-09-01 | End: 2020-11-25 | Stop reason: SDUPTHER

## 2020-11-25 DIAGNOSIS — F41.1 GAD (GENERALIZED ANXIETY DISORDER): ICD-10-CM

## 2020-11-25 RX ORDER — ALPRAZOLAM 0.5 MG/1
0.5 TABLET ORAL NIGHTLY PRN
Qty: 90 TAB | Refills: 0 | Status: SHIPPED | OUTPATIENT
Start: 2020-11-25 | End: 2021-02-26 | Stop reason: SDUPTHER

## 2020-12-15 ENCOUNTER — OFFICE VISIT (OUTPATIENT)
Dept: MEDICAL GROUP | Facility: LAB | Age: 50
End: 2020-12-15
Payer: COMMERCIAL

## 2020-12-15 VITALS
BODY MASS INDEX: 22.49 KG/M2 | DIASTOLIC BLOOD PRESSURE: 78 MMHG | WEIGHT: 135 LBS | SYSTOLIC BLOOD PRESSURE: 120 MMHG | RESPIRATION RATE: 12 BRPM | OXYGEN SATURATION: 97 % | HEART RATE: 68 BPM | TEMPERATURE: 98.1 F | HEIGHT: 65 IN

## 2020-12-15 DIAGNOSIS — Z23 NEED FOR VACCINATION: ICD-10-CM

## 2020-12-15 DIAGNOSIS — H92.01 ACUTE OTALGIA, RIGHT: ICD-10-CM

## 2020-12-15 DIAGNOSIS — H69.91 DYSFUNCTION OF RIGHT EUSTACHIAN TUBE: ICD-10-CM

## 2020-12-15 PROCEDURE — 90686 IIV4 VACC NO PRSV 0.5 ML IM: CPT | Performed by: NURSE PRACTITIONER

## 2020-12-15 PROCEDURE — 90471 IMMUNIZATION ADMIN: CPT | Performed by: NURSE PRACTITIONER

## 2020-12-15 PROCEDURE — 99213 OFFICE O/P EST LOW 20 MIN: CPT | Mod: 25 | Performed by: NURSE PRACTITIONER

## 2020-12-15 RX ORDER — FLUTICASONE PROPIONATE 50 MCG
2 SPRAY, SUSPENSION (ML) NASAL DAILY
Qty: 15.8 ML | Refills: 1 | Status: SHIPPED | OUTPATIENT
Start: 2020-12-15

## 2020-12-15 NOTE — PROGRESS NOTES
"Chief Complaint   Patient presents with   • Otalgia     X 2 weeks       HPI  Kisha is a 50-year-old established female here with complaint of new onset right ear pain x the past 2 weeks.  Mentions yearly ear issues.  Pain is intermittent.  Pain is an ache / hot / itchy.  Occasional drainage down throat.  Occasional itchy, draining eyes.  No otc meds except advil.  No surgical ear / sinus hx.  Nonsmoker.  Denies any other associated symptoms.     Past medical, surgical, family, and social history is reviewed and updated in Epic chart by me today.   Medications and allergies reviewed and updated in Epic chart by me today.     ROS:   As documented in history of present illness above    Exam:  /78 (BP Location: Right arm, Patient Position: Sitting, BP Cuff Size: Adult)   Pulse 68   Temp 36.7 °C (98.1 °F)   Resp 12   Ht 1.651 m (5' 5\")   Wt 61.2 kg (135 lb)   SpO2 97%   Constitutional: Alert, no distress, plus 3 vital signs  Skin:  Warm, dry, no rashes invisible areas  Eye: Equal, round and reactive, conjunctiva without injection or exudate  ENMT: Lips without lesions, good dentition, oropharynx without erythema, exudate or edema.  Bilateral tympanic membranes are translucent and there is no erythema to the ear canal or external ear.  Overall benign ear exam.  Neck: Trachea midline, no masses, no thyromegaly.  No enlarged cervical lymphadenopathy and she is nontender to palpation of her cervical lymph nodes.  Respiratory: Unlabored respiration, lungs clear to auscultation, no wheezes, no rhonchi  Cardiovascular: Normal rate and rhythm, no murmur, no edema  Psych: Alert, pleasant, well-groomed, normal affect    Assessment / Plan / Medical Decision makin. Dysfunction of right eustachian tube  -Discussed benign exam.  Discomfort is likely from eustachian tube dysfunction/inflammation of the eustachian tube, potentially related to environmental allergens.  Discussed oral antihistamines with decongestants " versus nasal steroids and she prefers to start with a nasal spray such as Flonase.  She will contact me in 10 to 14 days if symptoms have not resolved, sooner with any worsening.  - fluticasone (FLONASE) 50 MCG/ACT nasal spray; Administer 2 Sprays into affected nostril(S) every day.  Dispense: 15.8 mL; Refill: 1    2. Acute otalgia, right  -As above  - fluticasone (FLONASE) 50 MCG/ACT nasal spray; Administer 2 Sprays into affected nostril(S) every day.  Dispense: 15.8 mL; Refill: 1    3. Need for vaccination  - was counseled regarding all immunizations, what the patient is being immunized against, possible side effects, and the importance of immunization.  - Influenza Vaccine Quad Injection (PF)

## 2020-12-20 DIAGNOSIS — Z23 NEED FOR VACCINATION: ICD-10-CM

## 2021-02-26 DIAGNOSIS — F41.1 GAD (GENERALIZED ANXIETY DISORDER): ICD-10-CM

## 2021-02-26 NOTE — TELEPHONE ENCOUNTER
Received request via: Pharmacy    Was the patient seen in the last year in this department? Yes  12/15/20  Does the patient have an active prescription (recently filled or refills available) for medication(s) requested? No

## 2021-02-28 RX ORDER — ALPRAZOLAM 0.5 MG/1
0.5 TABLET ORAL NIGHTLY PRN
Qty: 90 TABLET | Refills: 0 | Status: SHIPPED | OUTPATIENT
Start: 2021-02-28 | End: 2021-05-25 | Stop reason: SDUPTHER

## 2021-05-10 DIAGNOSIS — E89.40 PREMATURE SURGICAL MENOPAUSE ON HRT: ICD-10-CM

## 2021-05-10 DIAGNOSIS — Z79.890 PREMATURE SURGICAL MENOPAUSE ON HRT: ICD-10-CM

## 2021-05-11 RX ORDER — ESTRADIOL 1 MG/1
TABLET ORAL
Qty: 90 TABLET | Refills: 3 | Status: SHIPPED | OUTPATIENT
Start: 2021-05-11 | End: 2022-05-23

## 2021-05-11 NOTE — TELEPHONE ENCOUNTER
Received request via: Pharmacy    Was the patient seen in the last year in this department? Yes  LOV 12/15/2020  Does the patient have an active prescription (recently filled or refills available) for medication(s) requested? No

## 2021-05-25 DIAGNOSIS — F41.1 GAD (GENERALIZED ANXIETY DISORDER): ICD-10-CM

## 2021-05-25 RX ORDER — ALPRAZOLAM 0.5 MG/1
0.5 TABLET ORAL NIGHTLY PRN
Qty: 90 TABLET | Refills: 0 | Status: SHIPPED | OUTPATIENT
Start: 2021-05-25 | End: 2021-08-26 | Stop reason: SDUPTHER

## 2021-07-08 ENCOUNTER — OFFICE VISIT (OUTPATIENT)
Dept: MEDICAL GROUP | Facility: LAB | Age: 51
End: 2021-07-08
Payer: COMMERCIAL

## 2021-07-08 VITALS
SYSTOLIC BLOOD PRESSURE: 124 MMHG | DIASTOLIC BLOOD PRESSURE: 80 MMHG | HEART RATE: 74 BPM | OXYGEN SATURATION: 95 % | WEIGHT: 134 LBS | TEMPERATURE: 98.5 F | BODY MASS INDEX: 22.33 KG/M2 | HEIGHT: 65 IN | RESPIRATION RATE: 12 BRPM

## 2021-07-08 DIAGNOSIS — M25.552 BILATERAL HIP PAIN: ICD-10-CM

## 2021-07-08 DIAGNOSIS — D22.9 BENIGN NEVUS: ICD-10-CM

## 2021-07-08 DIAGNOSIS — M54.41 CHRONIC RIGHT-SIDED LOW BACK PAIN WITH RIGHT-SIDED SCIATICA: ICD-10-CM

## 2021-07-08 DIAGNOSIS — G89.29 CHRONIC RIGHT-SIDED LOW BACK PAIN WITH RIGHT-SIDED SCIATICA: ICD-10-CM

## 2021-07-08 DIAGNOSIS — M25.551 BILATERAL HIP PAIN: ICD-10-CM

## 2021-07-08 PROCEDURE — 99214 OFFICE O/P EST MOD 30 MIN: CPT | Performed by: NURSE PRACTITIONER

## 2021-07-08 ASSESSMENT — PATIENT HEALTH QUESTIONNAIRE - PHQ9: CLINICAL INTERPRETATION OF PHQ2 SCORE: 0

## 2021-07-08 NOTE — PROGRESS NOTES
"Chief Complaint   Patient presents with   • Hip Pain   • Nevus       HPI  Kisha is a 49 yo est female here with two issues :    #1- New nevi between breasts x a few weeks.  Denies associated bleeding / pain or any other symptoms.      #2- Bilateral hip and low back pain x at least a year.  Went to PT a few x and continued doing exercises at home for the next several months.  Unfortunately still in significant pain.  Fortunately she is not in significant pain if she is up and moving around the home.  Hurts in bed in the morning - points to lateral / distal bilateral hips / low back.  Pain radiates down right buttocks to calf.  Muscles in hips / buttocks feel tight / painful.  Pain is worse with yoga / spinning / going on walks.     Ibuprofen helps - 2-3 at a time.  meloxicam did not help.    Sitting in ice cold tub helps.    No hx of injury or trauma.     Past medical, surgical, family, and social history is reviewed and updated in Epic chart by me today.   Medications and allergies reviewed and updated in Epic chart by me today.     ROS:   As documented in history of present illness above    Exam:  /80 (BP Location: Left arm, Patient Position: Sitting, BP Cuff Size: Adult)   Pulse 74   Temp 36.9 °C (98.5 °F)   Resp 12   Ht 1.651 m (5' 5\")   Wt 60.8 kg (134 lb)   SpO2 95%   Constitutional: Alert, no distress, plus 3 vital signs  Skin:  Warm, dry.  4 x 3 mm raised symmetrical hyper-pigemented lesion in-between breasts.   Eye: Equal, round and reactive, conjunctiva clear  ENMT: Lips without lesions, good dentition, oropharynx clear    Neck: Trachea midline, no masses, no thyromegaly  Respiratory: Unlabored respiration, lungs clear to auscultation, no wheezes, no rhonchi  Cardiovascular: Normal rate   M/s:  Neg straight leg raise bilaterally. Spine without overlying deformity / bruising / rash.  No point bony tenderness to lumbar spine or hips.    Psych: Alert, pleasant, well-groomed, normal " affect    Assessment / Plan / Medical Decision makin. Chronic right-sided low back pain with right-sided sciatica  -failed a year of conservative therapy.  Recommend returning to PT, continuing nsaids once daily prn with gi / renal precautions and having MRI lumbar spine.  Discussed possibly moving forward with physiatry vs ortho consult depending on outcome of PT and MRI.   - MR-LUMBAR SPINE-W/O; Future  - REFERRAL TO PHYSICAL THERAPY    2. Bilateral hip pain    - MR-LUMBAR SPINE-W/O; Future  - REFERRAL TO PHYSICAL THERAPY    3. Benign nevus  -discussed benign nature and s/s of various types of skin CA / need for f/u with change.

## 2021-08-17 ENCOUNTER — APPOINTMENT (OUTPATIENT)
Dept: PHYSICAL THERAPY | Facility: MEDICAL CENTER | Age: 51
End: 2021-08-17
Attending: NURSE PRACTITIONER
Payer: COMMERCIAL

## 2021-08-23 ENCOUNTER — APPOINTMENT (OUTPATIENT)
Dept: PHYSICAL THERAPY | Facility: MEDICAL CENTER | Age: 51
End: 2021-08-23
Attending: NURSE PRACTITIONER
Payer: COMMERCIAL

## 2021-08-26 DIAGNOSIS — F41.1 GAD (GENERALIZED ANXIETY DISORDER): ICD-10-CM

## 2021-08-26 RX ORDER — ALPRAZOLAM 0.5 MG/1
0.5 TABLET ORAL NIGHTLY PRN
Qty: 90 TABLET | Refills: 0 | Status: SHIPPED | OUTPATIENT
Start: 2021-08-26 | End: 2021-11-19 | Stop reason: SDUPTHER

## 2021-08-31 ENCOUNTER — APPOINTMENT (OUTPATIENT)
Dept: RADIOLOGY | Facility: MEDICAL CENTER | Age: 51
End: 2021-08-31
Attending: NURSE PRACTITIONER
Payer: COMMERCIAL

## 2021-08-31 DIAGNOSIS — Z12.31 VISIT FOR SCREENING MAMMOGRAM: ICD-10-CM

## 2021-09-08 ENCOUNTER — PATIENT MESSAGE (OUTPATIENT)
Dept: MEDICAL GROUP | Facility: LAB | Age: 51
End: 2021-09-08

## 2021-09-09 RX ORDER — ONDANSETRON 4 MG/1
4 TABLET, FILM COATED ORAL EVERY 8 HOURS PRN
Qty: 20 TABLET | Refills: 0 | Status: SHIPPED | OUTPATIENT
Start: 2021-09-09

## 2021-09-09 NOTE — TELEPHONE ENCOUNTER
Please let her know that I sent Zofran for nausea to [facility].  No worries in terms of a rash with Covid, this will improve and she can take an antihistamine such as Benadryl at night or Claritin during the day as well as use topical cortisone 10.  It is useless to check Covid antibodies until 3 months after the infection.

## 2021-11-12 DIAGNOSIS — B00.9 HERPES SIMPLEX: ICD-10-CM

## 2021-11-12 RX ORDER — ACYCLOVIR 400 MG/1
TABLET ORAL
Qty: 36 TABLET | Refills: 3 | Status: SHIPPED | OUTPATIENT
Start: 2021-11-12 | End: 2023-02-21

## 2021-11-19 DIAGNOSIS — F41.1 GAD (GENERALIZED ANXIETY DISORDER): ICD-10-CM

## 2021-11-22 RX ORDER — ALPRAZOLAM 0.5 MG/1
0.5 TABLET ORAL NIGHTLY PRN
Qty: 90 TABLET | Refills: 0 | Status: SHIPPED | OUTPATIENT
Start: 2021-11-22 | End: 2022-02-16 | Stop reason: SDUPTHER

## 2021-12-29 ENCOUNTER — APPOINTMENT (OUTPATIENT)
Dept: MEDICAL GROUP | Facility: LAB | Age: 51
End: 2021-12-29
Payer: COMMERCIAL

## 2022-01-05 ENCOUNTER — OFFICE VISIT (OUTPATIENT)
Dept: MEDICAL GROUP | Facility: LAB | Age: 52
End: 2022-01-05
Payer: COMMERCIAL

## 2022-01-05 VITALS
DIASTOLIC BLOOD PRESSURE: 80 MMHG | HEART RATE: 86 BPM | HEIGHT: 65 IN | TEMPERATURE: 98.2 F | RESPIRATION RATE: 12 BRPM | OXYGEN SATURATION: 95 % | WEIGHT: 129 LBS | SYSTOLIC BLOOD PRESSURE: 120 MMHG | BODY MASS INDEX: 21.49 KG/M2

## 2022-01-05 DIAGNOSIS — L65.9 HAIR LOSS: ICD-10-CM

## 2022-01-05 DIAGNOSIS — Z90.710 S/P TOTAL HYSTERECTOMY: ICD-10-CM

## 2022-01-05 DIAGNOSIS — Z00.00 PREVENTATIVE HEALTH CARE: ICD-10-CM

## 2022-01-05 PROCEDURE — 99213 OFFICE O/P EST LOW 20 MIN: CPT | Performed by: NURSE PRACTITIONER

## 2022-01-05 ASSESSMENT — PATIENT HEALTH QUESTIONNAIRE - PHQ9: CLINICAL INTERPRETATION OF PHQ2 SCORE: 0

## 2022-01-05 NOTE — PROGRESS NOTES
"Chief Complaint   Patient presents with   • Other     Hormones       HPI:  Kisha is a 51-year-old established female here with complaint of hair loss.  Typically has extremely thick hair.  Ponytail \"half the size,\" noted first 6-8 months ago.  Notes significant shedding that increased last summer both with washing hair and throughout the day.  No new diets.  Had covid 9/2021.    Stress level is high but normal for her.   Had total hysterectomy 2006 - takes 1 mg estradiol everyday.   Diet: 2 eggs, lunch -soup (homemade), dinner varies.  Drinks mostly water / herbal tea.  Alcohol: 2 glasses wine nightly - small - chronic.  Mom does not significant hair loss.  Sister's hair is very thick.  One sister with hypothyroidism.  Night sweats since 9/2021.    Rare daytime hot flashes.   Denies constipation.    Denies urinary issues.    Denies hematochezia / hematuria.  Denies unintentional weight changes.   Joints hurt more than they use to in the morning x the past year.    Vision has decreased in the past year per her eye doctor.  + dry eyes.      Exam:  /80 (BP Location: Right arm, Patient Position: Sitting, BP Cuff Size: Adult)   Pulse 86   Temp 36.8 °C (98.2 °F)   Resp 12   Ht 1.651 m (5' 5\")   Wt 58.5 kg (129 lb)   SpO2 95%   Gen. appears healthy in no distress   Skin appropriate for sex and age.  She has healthy, thick appearing hair without any spots of alopecia.  Neck trachea is midline  Lungs unlabored breathing  Heart regular rate  Neuro gait and station normal   Psych appropriate, calm, interactive, well-groomed    A/P:  \"  1. Hair loss  CBC WITH DIFFERENTIAL    TSH    FREE THYROXINE    IRON/TOTAL IRON BIND   2. Preventative health care  CBC WITH DIFFERENTIAL    Comp Metabolic Panel    Lipid Profile    TSH    FREE THYROXINE    ESTRADIOL    IRON/TOTAL IRON BIND    VITAMIN D,25 HYDROXY   3. S/P total hysterectomy  ESTRADIOL   \"  Recommend a full updated lab panel.  Discussed potential causes of hair " loss/setting.  Fortunately she does have a healthy diet without any recent changes but we discussed increasing protein levels.  Also briefly discussed increasing estradiol to 1.5 mg, based on estrogen level and all other lab results.  I will get back in touch with her as soon as labs return, with further recommendations.

## 2022-02-15 ENCOUNTER — HOSPITAL ENCOUNTER (OUTPATIENT)
Dept: LAB | Facility: MEDICAL CENTER | Age: 52
End: 2022-02-15
Attending: NURSE PRACTITIONER
Payer: COMMERCIAL

## 2022-02-15 DIAGNOSIS — Z00.00 PREVENTATIVE HEALTH CARE: ICD-10-CM

## 2022-02-15 DIAGNOSIS — Z90.710 S/P TOTAL HYSTERECTOMY: ICD-10-CM

## 2022-02-15 DIAGNOSIS — L65.9 HAIR LOSS: ICD-10-CM

## 2022-02-15 LAB
25(OH)D3 SERPL-MCNC: 44 NG/ML (ref 30–100)
ALBUMIN SERPL BCP-MCNC: 4.4 G/DL (ref 3.2–4.9)
ALBUMIN/GLOB SERPL: 1.8 G/DL
ALP SERPL-CCNC: 58 U/L (ref 30–99)
ALT SERPL-CCNC: 11 U/L (ref 2–50)
ANION GAP SERPL CALC-SCNC: 10 MMOL/L (ref 7–16)
AST SERPL-CCNC: 12 U/L (ref 12–45)
BASOPHILS # BLD AUTO: 0.6 % (ref 0–1.8)
BASOPHILS # BLD: 0.03 K/UL (ref 0–0.12)
BILIRUB SERPL-MCNC: 0.4 MG/DL (ref 0.1–1.5)
BUN SERPL-MCNC: 13 MG/DL (ref 8–22)
CALCIUM SERPL-MCNC: 9 MG/DL (ref 8.5–10.5)
CHLORIDE SERPL-SCNC: 106 MMOL/L (ref 96–112)
CHOLEST SERPL-MCNC: 223 MG/DL (ref 100–199)
CO2 SERPL-SCNC: 25 MMOL/L (ref 20–33)
CREAT SERPL-MCNC: 0.86 MG/DL (ref 0.5–1.4)
EOSINOPHIL # BLD AUTO: 0.14 K/UL (ref 0–0.51)
EOSINOPHIL NFR BLD: 2.6 % (ref 0–6.9)
ERYTHROCYTE [DISTWIDTH] IN BLOOD BY AUTOMATED COUNT: 46.6 FL (ref 35.9–50)
ESTRADIOL SERPL-MCNC: 113 PG/ML
FASTING STATUS PATIENT QL REPORTED: NORMAL
GLOBULIN SER CALC-MCNC: 2.5 G/DL (ref 1.9–3.5)
GLUCOSE SERPL-MCNC: 86 MG/DL (ref 65–99)
HCT VFR BLD AUTO: 45.5 % (ref 37–47)
HDLC SERPL-MCNC: 119 MG/DL
HGB BLD-MCNC: 14.6 G/DL (ref 12–16)
IMM GRANULOCYTES # BLD AUTO: 0.01 K/UL (ref 0–0.11)
IMM GRANULOCYTES NFR BLD AUTO: 0.2 % (ref 0–0.9)
IRON SATN MFR SERPL: 39 % (ref 15–55)
IRON SERPL-MCNC: 109 UG/DL (ref 40–170)
LDLC SERPL CALC-MCNC: 80 MG/DL
LYMPHOCYTES # BLD AUTO: 2.17 K/UL (ref 1–4.8)
LYMPHOCYTES NFR BLD: 40.4 % (ref 22–41)
MCH RBC QN AUTO: 30.5 PG (ref 27–33)
MCHC RBC AUTO-ENTMCNC: 32.1 G/DL (ref 33.6–35)
MCV RBC AUTO: 95 FL (ref 81.4–97.8)
MONOCYTES # BLD AUTO: 0.37 K/UL (ref 0–0.85)
MONOCYTES NFR BLD AUTO: 6.9 % (ref 0–13.4)
NEUTROPHILS # BLD AUTO: 2.65 K/UL (ref 2–7.15)
NEUTROPHILS NFR BLD: 49.3 % (ref 44–72)
NRBC # BLD AUTO: 0 K/UL
NRBC BLD-RTO: 0 /100 WBC
PLATELET # BLD AUTO: 168 K/UL (ref 164–446)
PMV BLD AUTO: 11.3 FL (ref 9–12.9)
POTASSIUM SERPL-SCNC: 4.5 MMOL/L (ref 3.6–5.5)
PROT SERPL-MCNC: 6.9 G/DL (ref 6–8.2)
RBC # BLD AUTO: 4.79 M/UL (ref 4.2–5.4)
SODIUM SERPL-SCNC: 141 MMOL/L (ref 135–145)
T4 FREE SERPL-MCNC: 1.06 NG/DL (ref 0.93–1.7)
TIBC SERPL-MCNC: 280 UG/DL (ref 250–450)
TRIGL SERPL-MCNC: 120 MG/DL (ref 0–149)
TSH SERPL DL<=0.005 MIU/L-ACNC: 4.7 UIU/ML (ref 0.38–5.33)
UIBC SERPL-MCNC: 171 UG/DL (ref 110–370)
WBC # BLD AUTO: 5.4 K/UL (ref 4.8–10.8)

## 2022-02-15 PROCEDURE — 80053 COMPREHEN METABOLIC PANEL: CPT

## 2022-02-15 PROCEDURE — 82670 ASSAY OF TOTAL ESTRADIOL: CPT

## 2022-02-15 PROCEDURE — 83550 IRON BINDING TEST: CPT

## 2022-02-15 PROCEDURE — 80061 LIPID PANEL: CPT

## 2022-02-15 PROCEDURE — 36415 COLL VENOUS BLD VENIPUNCTURE: CPT

## 2022-02-15 PROCEDURE — 82306 VITAMIN D 25 HYDROXY: CPT

## 2022-02-15 PROCEDURE — 83540 ASSAY OF IRON: CPT

## 2022-02-15 PROCEDURE — 84439 ASSAY OF FREE THYROXINE: CPT

## 2022-02-15 PROCEDURE — 84443 ASSAY THYROID STIM HORMONE: CPT

## 2022-02-15 PROCEDURE — 85025 COMPLETE CBC W/AUTO DIFF WBC: CPT

## 2022-02-16 DIAGNOSIS — F41.1 GAD (GENERALIZED ANXIETY DISORDER): ICD-10-CM

## 2022-02-16 NOTE — TELEPHONE ENCOUNTER
Received request via: Pharmacy    Was the patient seen in the last year in this department? Yes  1/5/22  Does the patient have an active prescription (recently filled or refills available) for medication(s) requested? No

## 2022-02-17 RX ORDER — ALPRAZOLAM 0.5 MG/1
0.5 TABLET ORAL NIGHTLY PRN
Qty: 90 TABLET | Refills: 0 | Status: SHIPPED | OUTPATIENT
Start: 2022-02-24 | End: 2022-05-23 | Stop reason: SDUPTHER

## 2022-03-31 ENCOUNTER — HOSPITAL ENCOUNTER (OUTPATIENT)
Dept: RADIOLOGY | Facility: MEDICAL CENTER | Age: 52
End: 2022-03-31
Attending: NURSE PRACTITIONER
Payer: COMMERCIAL

## 2022-03-31 DIAGNOSIS — Z12.31 VISIT FOR SCREENING MAMMOGRAM: ICD-10-CM

## 2022-03-31 PROCEDURE — 77063 BREAST TOMOSYNTHESIS BI: CPT

## 2022-05-22 DIAGNOSIS — Z79.890 PREMATURE SURGICAL MENOPAUSE ON HRT: ICD-10-CM

## 2022-05-22 DIAGNOSIS — E89.40 PREMATURE SURGICAL MENOPAUSE ON HRT: ICD-10-CM

## 2022-05-23 DIAGNOSIS — F41.1 GAD (GENERALIZED ANXIETY DISORDER): ICD-10-CM

## 2022-05-23 RX ORDER — ALPRAZOLAM 0.5 MG/1
0.5 TABLET ORAL NIGHTLY PRN
Qty: 90 TABLET | Refills: 0 | Status: SHIPPED | OUTPATIENT
Start: 2022-05-23 | End: 2022-08-16 | Stop reason: SDUPTHER

## 2022-05-23 RX ORDER — ESTRADIOL 1 MG/1
TABLET ORAL
Qty: 90 TABLET | Refills: 3 | Status: SHIPPED | OUTPATIENT
Start: 2022-05-23 | End: 2023-08-28 | Stop reason: SDUPTHER

## 2022-05-23 NOTE — TELEPHONE ENCOUNTER
Received request via: Pharmacy    Was the patient seen in the last year in this department? Yes/last fill 2/21/22 1/5/22  Does the patient have an active prescription (recently filled or refills available) for medication(s) requested? No

## 2022-08-04 ENCOUNTER — OFFICE VISIT (OUTPATIENT)
Dept: MEDICAL GROUP | Facility: LAB | Age: 52
End: 2022-08-04
Payer: COMMERCIAL

## 2022-08-04 ENCOUNTER — TELEPHONE (OUTPATIENT)
Dept: MEDICAL GROUP | Facility: LAB | Age: 52
End: 2022-08-04

## 2022-08-04 VITALS
HEIGHT: 65 IN | RESPIRATION RATE: 12 BRPM | SYSTOLIC BLOOD PRESSURE: 130 MMHG | WEIGHT: 129 LBS | TEMPERATURE: 96.7 F | HEART RATE: 80 BPM | DIASTOLIC BLOOD PRESSURE: 88 MMHG | BODY MASS INDEX: 21.49 KG/M2 | OXYGEN SATURATION: 96 %

## 2022-08-04 DIAGNOSIS — R10.9 ABDOMINAL PAIN, UNSPECIFIED ABDOMINAL LOCATION: ICD-10-CM

## 2022-08-04 DIAGNOSIS — Z79.890 HORMONE REPLACEMENT THERAPY (HRT): ICD-10-CM

## 2022-08-04 DIAGNOSIS — Z12.11 SPECIAL SCREENING FOR MALIGNANT NEOPLASM OF COLON: ICD-10-CM

## 2022-08-04 DIAGNOSIS — R10.9 CHRONIC ABDOMINAL PAIN: ICD-10-CM

## 2022-08-04 DIAGNOSIS — G89.29 CHRONIC ABDOMINAL PAIN: ICD-10-CM

## 2022-08-04 LAB
APPEARANCE UR: CLEAR
BILIRUB UR STRIP-MCNC: NORMAL MG/DL
COLOR UR AUTO: YELLOW
GLUCOSE UR STRIP.AUTO-MCNC: NORMAL MG/DL
KETONES UR STRIP.AUTO-MCNC: NORMAL MG/DL
LEUKOCYTE ESTERASE UR QL STRIP.AUTO: NORMAL
NITRITE UR QL STRIP.AUTO: NORMAL
PH UR STRIP.AUTO: 5.5 [PH] (ref 5–8)
PROT UR QL STRIP: NORMAL MG/DL
RBC UR QL AUTO: NORMAL
SP GR UR STRIP.AUTO: 1.01
UROBILINOGEN UR STRIP-MCNC: 0.2 MG/DL

## 2022-08-04 PROCEDURE — 99214 OFFICE O/P EST MOD 30 MIN: CPT | Performed by: NURSE PRACTITIONER

## 2022-08-04 PROCEDURE — 81002 URINALYSIS NONAUTO W/O SCOPE: CPT | Performed by: NURSE PRACTITIONER

## 2022-08-04 RX ORDER — ESTERIFIED ESTROGEN AND METHYLTESTOSTERONE 1.25; 2.5 MG/1; MG/1
1 TABLET ORAL DAILY
Qty: 30 TABLET | Refills: 2 | Status: SHIPPED | OUTPATIENT
Start: 2022-08-04 | End: 2023-07-19 | Stop reason: SDUPTHER

## 2022-08-04 ASSESSMENT — FIBROSIS 4 INDEX: FIB4 SCORE: 1.08

## 2022-08-04 NOTE — PROGRESS NOTES
"Chief Complaint   Patient presents with   • Bladder Problem         HPI:  Kisha is a 52 year old established female here for abdominal pain- ongoing for years but worsening since February 2022. Quality: constant pressure across lower abdomen, focused RLQ that is sensitive to touch.Denies dysuria, hematuria, vaginal d/c, n/v. +constipation +painful BMs- alleviated by fiber pill nightly.   Nml CT abd/pelvis in 2019. Has never had a colonoscopy.   Pain improved with moving around.   Pain is worse with bladder being full.  s  S[facility] gyn ([facility] - Dr. Sandy) - was told that they could go in and remove adhesions but they may grow back.      Also requesting to go back on Estratest as her libido has bottomed out.  She did well with Estratest in the past.  She had a hysterectomy in her 30s.    Exam:  /88 (BP Location: Right arm, Patient Position: Sitting, BP Cuff Size: Adult)   Pulse 80   Temp 35.9 °C (96.7 °F)   Resp 12   Ht 1.651 m (5' 5\")   Wt 58.5 kg (129 lb)   SpO2 96%   Gen: NAD  Resp: nonlabored.  Able to speak in full sentences  Abd:  Soft, no masses felt.  No guarding or rebound tenderness.  She verbalizes tenderness to right and left lower quadrant but states that this is chronic and nothing that she would acutely say is painful for her today out of the ordinary.  Psy: pleasant / cooperative.   Neuro:  Alert and oriented x 3    A/P:  \"  1. Chronic abdominal pain  Referral to Gastroenterology   2. Abdominal pain, unspecified abdominal location  POCT Urinalysis   3. Special screening for malignant neoplasm of colon  Referral to Gastroenterology   4. Hormone replacement therapy (HRT)  esterified estrogen-methyltest (ESTRATEST) 1.25-2.5 MG Tab     In terms of chronic abdominal pain, it is certainly time for her to have her first screening colonoscopy and I would like for her to review her chronic abdominal pain with gastroenterology.  She has been told in the past by gynecology this is pain from " adhesions/scar tissue related to her hysterectomy over a decade ago and they recommended against surgery for this.  After she has a consult with GI, if she prefers, we could certainly consult general surgery for their opinion.  We also briefly discussed symptomatic medications for abdominal pain such as low-dose amitriptyline or gabapentin, today she preferred to refrain from these.    Restarted Estratest.  Counseled regarding the risk versus benefit of postmenopausal hormone replacement therapy such as the potential increased risk of breast and gynecological cancers as well as blood clots and she verbalized understanding.  Mammogram is up-to-date.  Pap smears not indicated.  She is taking 1 mg of estradiol and she will cut this back to half pill for 1 week and then discontinue supplemental 1 mg estrogen as her Estratest will contain 1.25 mg of estrogen.    Side effects of all medications prescribed today were discussed with the patient including how to take the medications and proper dosage. Discussed repercussions of not taking the medications as prescribed. Instructed to call the office should she have any negative side effects or problems with the medications.

## 2022-08-04 NOTE — LETTER
[facility]  Gabby Gayle, A.P.N.  [address] [facility] [address]  [place] [address]  Fax: [phone]   Authorization for Release/Disclosure of   Protected Health Information   Name: MANUEL DUNNE : 1970 SSN: [id number]   Address: [address] [place] [address]  [place] [address] Phone:    [phone] (home)    I authorize the entity listed below to release/disclose the PHI below to:   [facility]/Gabby Gayle, A.P.N. and Gabby Gayle A.P.N.   Provider or Entity Name:  Dr Sandy    Address   City, State, Zip   Phone:      Fax:  [phone]      Reason for request: continuity of care   Information to be released:    [  ] LAST COLONOSCOPY,  including any PATH REPORT and follow-up  [  ] LAST FIT/COLOGUARD RESULT [  ] LAST DEXA  [  ] LAST MAMMOGRAM  [  ] LAST PAP  [  ] LAST LABS [  ] RETINA EXAM REPORT  [  ] IMMUNIZATION RECORDS  [ XX ] Release consult note from Dr. Lilliam sandy's office from a few years ago        [  ] Check here and initial the line next to each item to release ALL health information INCLUDING  _____ Care and treatment for drug and / or alcohol abuse  _____ HIV testing, infection status, or AIDS  _____ Genetic Testing    DATES OF SERVICE OR TIME PERIOD TO BE DISCLOSED: _____________  I understand and acknowledge that:  * This Authorization may be revoked at any time by you in writing, except if your health information has already been used or disclosed.  * Your health information that will be used or disclosed as a result of you signing this authorization could be re-disclosed by the recipient. If this occurs, your re-disclosed health information may no longer be protected by State or Federal laws.  * You may refuse to sign this Authorization. Your refusal will not affect your ability to obtain treatment.  * This Authorization becomes effective upon signing and will  on (date) __________.      If no date is indicated, this Authorization will  one (1) year from  the signature date.    Name: Louann Infante    Signature: CONTINUITY OF CARE   Date:     8/4/2022       PLEASE FAX REQUESTED RECORDS BACK TO: [phone]

## 2022-08-04 NOTE — TELEPHONE ENCOUNTER
----- Message from ELMER Donohue sent at 8/4/2022  9:09 AM PDT -----  Please obtain consult note from Dr. Lilliam vieira's office from a few years ago. Thanks!

## 2022-08-08 ENCOUNTER — PATIENT MESSAGE (OUTPATIENT)
Dept: MEDICAL GROUP | Facility: LAB | Age: 52
End: 2022-08-08
Payer: COMMERCIAL

## 2022-08-08 RX ORDER — AMITRIPTYLINE HYDROCHLORIDE 10 MG/1
10 TABLET, FILM COATED ORAL NIGHTLY
Qty: 30 TABLET | Refills: 1 | Status: SHIPPED
Start: 2022-08-08 | End: 2023-02-21

## 2022-08-16 DIAGNOSIS — F41.1 GAD (GENERALIZED ANXIETY DISORDER): ICD-10-CM

## 2022-08-16 NOTE — TELEPHONE ENCOUNTER
Received request via: Pharmacy    Was the patient seen in the last year in this department? Yes  8/4/22  Does the patient have an active prescription (recently filled or refills available) for medication(s) requested? No

## 2022-08-17 RX ORDER — ALPRAZOLAM 0.5 MG/1
0.5 TABLET ORAL NIGHTLY PRN
Qty: 90 TABLET | Refills: 0 | Status: SHIPPED | OUTPATIENT
Start: 2022-08-17 | End: 2022-11-15 | Stop reason: SDUPTHER

## 2022-09-01 ENCOUNTER — PATIENT MESSAGE (OUTPATIENT)
Dept: MEDICAL GROUP | Facility: LAB | Age: 52
End: 2022-09-01
Payer: COMMERCIAL

## 2022-09-01 DIAGNOSIS — R10.30 LOWER ABDOMINAL PAIN: ICD-10-CM

## 2022-09-21 ENCOUNTER — HOSPITAL ENCOUNTER (OUTPATIENT)
Dept: RADIOLOGY | Facility: MEDICAL CENTER | Age: 52
End: 2022-09-21
Attending: NURSE PRACTITIONER
Payer: COMMERCIAL

## 2022-09-21 DIAGNOSIS — R10.30 LOWER ABDOMINAL PAIN: ICD-10-CM

## 2022-09-21 PROCEDURE — 700117 HCHG RX CONTRAST REV CODE 255: Performed by: NURSE PRACTITIONER

## 2022-09-21 PROCEDURE — 74177 CT ABD & PELVIS W/CONTRAST: CPT

## 2022-09-21 RX ADMIN — IOHEXOL 25 ML: 240 INJECTION, SOLUTION INTRATHECAL; INTRAVASCULAR; INTRAVENOUS; ORAL at 14:30

## 2022-09-21 RX ADMIN — IOHEXOL 100 ML: 350 INJECTION, SOLUTION INTRAVENOUS at 16:00

## 2022-11-15 DIAGNOSIS — F41.1 GAD (GENERALIZED ANXIETY DISORDER): ICD-10-CM

## 2022-11-15 RX ORDER — ALPRAZOLAM 0.5 MG/1
0.5 TABLET ORAL NIGHTLY PRN
Qty: 90 TABLET | Refills: 0 | Status: SHIPPED | OUTPATIENT
Start: 2022-11-15 | End: 2023-02-21

## 2022-11-15 NOTE — TELEPHONE ENCOUNTER
Received request via: Pharmacy  8/4/22  Was the patient seen in the last year in this department? Yes    Does the patient have an active prescription (recently filled or refills available) for medication(s) requested? No    Does the patient have group home Plus and need 100 day supply (blood pressure, diabetes and cholesterol meds only)?

## 2023-01-09 NOTE — PROGRESS NOTES
"Chief Complaint   Patient presents with   • Urinary Frequency     frequency & pain, onset 5 days        HPI  Louann is a 46-year-old established female here with complaint of new onset dysuria, urinary frequency and urinary hesitancy over the past 5 days. Her symptoms have been coming and going. She has tried cranberry juice and over-the-counter Azo with some temporary improvement. Her symptoms returned yesterday with full force with a lot of dysuria and suprapubic discomfort. She denies any systemic symptoms such as fevers, chills or body aches. No history of chronic or recurrent UTI. She was sexually active with her  more frequently last weekend than normal.      Past medical, surgical, family, and social history is reviewed and updated in Epic chart by me today.   Medications and allergies reviewed and updated in Epic chart by me today.     ROS:   As documented in history of present illness above    Exam:  Blood pressure 98/80, pulse 91, temperature 37 °C (98.6 °F), resp. rate 12, height 1.626 m (5' 4\"), weight 63.504 kg (140 lb), SpO2 97 %.  Constitutional: Alert, no distress, plus 3 vital signs  Skin:  Warm, dry, no rashes invisible areas  Eye: Equal, round and reactive, conjunctiva clear  Neck: Trachea midline  Respiratory: Unlabored respiration  Cardiovascular: Normal rate   Abdomen: Soft with suprapubic tenderness, no CVA tenderness.  Psych: Alert, pleasant, well-groomed, normal affect    A/P:  1. Urinary tract infection, site unspecified  -Urinalysis is positive. Start Macrobid twice a day for 7 days. Culture sent as this is her first UTI in about 10 years. Recommended increased water intake, voiding after intercourse and following up if symptoms have not completely resolved within one week sooner with any worsening.  - POCT Urinalysis  - nitrofurantoin monohydr macro (MACROBID) 100 MG Cap; Take 1 Cap by mouth 2 times a day for 7 days.  Dispense: 14 Cap; Refill: 0  - URINE CULTURE(NEW); Future      " 

## 2023-02-20 DIAGNOSIS — F41.1 GAD (GENERALIZED ANXIETY DISORDER): ICD-10-CM

## 2023-02-21 ENCOUNTER — OFFICE VISIT (OUTPATIENT)
Dept: MEDICAL GROUP | Facility: LAB | Age: 53
End: 2023-02-21
Payer: COMMERCIAL

## 2023-02-21 VITALS
DIASTOLIC BLOOD PRESSURE: 80 MMHG | OXYGEN SATURATION: 98 % | TEMPERATURE: 97.6 F | SYSTOLIC BLOOD PRESSURE: 118 MMHG | WEIGHT: 133 LBS | RESPIRATION RATE: 12 BRPM | HEART RATE: 86 BPM | BODY MASS INDEX: 22.16 KG/M2 | HEIGHT: 65 IN

## 2023-02-21 DIAGNOSIS — B00.9 HSV-1 (HERPES SIMPLEX VIRUS 1) INFECTION: ICD-10-CM

## 2023-02-21 DIAGNOSIS — Z12.31 ENCOUNTER FOR SCREENING MAMMOGRAM FOR BREAST CANCER: ICD-10-CM

## 2023-02-21 PROCEDURE — 99213 OFFICE O/P EST LOW 20 MIN: CPT | Performed by: NURSE PRACTITIONER

## 2023-02-21 RX ORDER — ALPRAZOLAM 0.5 MG/1
TABLET ORAL
Qty: 90 TABLET | Refills: 0 | Status: SHIPPED | OUTPATIENT
Start: 2023-02-21 | End: 2023-05-23

## 2023-02-21 RX ORDER — VALACYCLOVIR HYDROCHLORIDE 1 G/1
TABLET, FILM COATED ORAL
Qty: 60 TABLET | Refills: 2 | Status: SHIPPED | OUTPATIENT
Start: 2023-02-21

## 2023-02-21 ASSESSMENT — FIBROSIS 4 INDEX: FIB4 SCORE: 1.08

## 2023-02-21 ASSESSMENT — PATIENT HEALTH QUESTIONNAIRE - PHQ9: CLINICAL INTERPRETATION OF PHQ2 SCORE: 0

## 2023-02-21 NOTE — TELEPHONE ENCOUNTER
Received request via: Pharmacy    Was the patient seen in the last year in this department? Yes  LOV : 8/4/2022   Does the patient have an active prescription (recently filled or refills available) for medication(s) requested? No    Does the patient have assisted Plus and need 100 day supply (blood pressure, diabetes and cholesterol meds only)? Patient does not have SCP

## 2023-02-21 NOTE — PROGRESS NOTES
"CC  Cold sores      HPI:  Kisha is a 52-year-old established female here with complaint of cold sores \"up in my nose,\" when she has allergies / runny nose x years - worsening.  Occurring at least once per month.  Seeing open sores to end and inside of nose -very painful.  Has acyclovir - takes tid when sores pop out -  does this for 3-4 days at a time and cold sores subside.    Using topical lysine / vit E.      Exam:  /80 (BP Location: Left arm, Patient Position: Sitting, BP Cuff Size: Adult)   Pulse 86   Temp 36.4 °C (97.6 °F)   Resp 12   Ht 1.651 m (5' 5\")   Wt 60.3 kg (133 lb)   SpO2 98%   Gen. appears healthy in no distress   Skin appropriate for sex and age   Neck trachea is midline  Lungs unlabored breathing  Heart regular rate  Neuro gait and station normal   Psych appropriate, calm, interactive, well-groomed      A/P:  \"  1. HSV-1 (herpes simplex virus 1) infection        2. Encounter for screening mammogram for breast cancer  MA-SCREENING MAMMO BILAT W/TOMOSYNTHESIS W/CAD      \"  She has used acyclovir for several years and it is taking 3 to 4 days for the cold sores to resolve.  Changed her antiviral to valacyclovir to take 2 pills which would be 2 g, at the onset of cold sores, repeating once the following day of cold sores persists.  Discussed that she can also take valacyclovir preventatively if desired such as when she will be encountering stressful weeks, sara vacations, etc. by taking 1 g daily for prevention.    We discussed her healthcare maintenance and encouraged her to schedule a mammogram along with a colonoscopy.  We mutually decided to refrain from lab work as hers was normal last year and she feels well.  Declined discussion of any vaccines today.  Encouraged Shingrix vaccine at her pharmacy.  "

## 2023-03-29 ENCOUNTER — OFFICE VISIT (OUTPATIENT)
Dept: MEDICAL GROUP | Facility: LAB | Age: 53
End: 2023-03-29
Payer: COMMERCIAL

## 2023-03-29 DIAGNOSIS — D22.9 ATYPICAL NEVI: ICD-10-CM

## 2023-03-29 PROCEDURE — 17000 DESTRUCT PREMALG LESION: CPT | Performed by: NURSE PRACTITIONER

## 2023-03-29 ASSESSMENT — FIBROSIS 4 INDEX: FIB4 SCORE: 1.08

## 2023-03-30 NOTE — PROGRESS NOTES
CC  mole    HPI:  Kisha is a 51 yo est female here with concern regarding irritated nevi to right neck / shoulder area - raised, catching on clothing and irritated.        Exam:  Skin:  raised, symmetrical slightly hyperpigmented nevi measuring 4-5 mm to right distal neck.  Gen:  NAD, WD, WN    A/P:  1. Atypical nevi          Benign but irritated appearing nevi.  Discussed tx options and pt preferred liquid nitrogen tx.  Froze nevi for 3-5 seconds, 3 x and pt tolerated well.  F/u 2 weeks for refreezing if needed.

## 2023-04-04 ENCOUNTER — APPOINTMENT (OUTPATIENT)
Dept: RADIOLOGY | Facility: MEDICAL CENTER | Age: 53
End: 2023-04-04
Attending: NURSE PRACTITIONER
Payer: COMMERCIAL

## 2023-04-04 DIAGNOSIS — Z12.31 ENCOUNTER FOR SCREENING MAMMOGRAM FOR BREAST CANCER: ICD-10-CM

## 2023-04-04 PROCEDURE — 77067 SCR MAMMO BI INCL CAD: CPT

## 2023-05-23 DIAGNOSIS — F41.1 GAD (GENERALIZED ANXIETY DISORDER): ICD-10-CM

## 2023-05-23 RX ORDER — ALPRAZOLAM 0.5 MG/1
TABLET ORAL
Qty: 90 TABLET | Refills: 0 | Status: SHIPPED | OUTPATIENT
Start: 2023-05-23 | End: 2023-08-21

## 2023-07-19 ENCOUNTER — OFFICE VISIT (OUTPATIENT)
Dept: MEDICAL GROUP | Facility: LAB | Age: 53
End: 2023-07-19
Payer: COMMERCIAL

## 2023-07-19 VITALS
SYSTOLIC BLOOD PRESSURE: 136 MMHG | BODY MASS INDEX: 22.16 KG/M2 | TEMPERATURE: 96.6 F | HEART RATE: 78 BPM | OXYGEN SATURATION: 97 % | RESPIRATION RATE: 12 BRPM | WEIGHT: 133 LBS | HEIGHT: 65 IN | DIASTOLIC BLOOD PRESSURE: 86 MMHG

## 2023-07-19 DIAGNOSIS — Z79.890 HORMONE REPLACEMENT THERAPY (HRT): ICD-10-CM

## 2023-07-19 DIAGNOSIS — G89.29 CHRONIC BILATERAL LOW BACK PAIN WITH RIGHT-SIDED SCIATICA: ICD-10-CM

## 2023-07-19 DIAGNOSIS — M54.41 CHRONIC BILATERAL LOW BACK PAIN WITH RIGHT-SIDED SCIATICA: ICD-10-CM

## 2023-07-19 DIAGNOSIS — T75.3XXA SEA SICKNESS, INITIAL ENCOUNTER: ICD-10-CM

## 2023-07-19 PROCEDURE — 3075F SYST BP GE 130 - 139MM HG: CPT | Performed by: NURSE PRACTITIONER

## 2023-07-19 PROCEDURE — 3079F DIAST BP 80-89 MM HG: CPT | Performed by: NURSE PRACTITIONER

## 2023-07-19 PROCEDURE — 99214 OFFICE O/P EST MOD 30 MIN: CPT | Performed by: NURSE PRACTITIONER

## 2023-07-19 RX ORDER — SCOLOPAMINE TRANSDERMAL SYSTEM 1 MG/1
1 PATCH, EXTENDED RELEASE TRANSDERMAL
Qty: 24 PATCH | Refills: 0 | Status: SHIPPED | OUTPATIENT
Start: 2023-07-19

## 2023-07-19 RX ORDER — ESTERIFIED ESTROGEN AND METHYLTESTOSTERONE 1.25; 2.5 MG/1; MG/1
1 TABLET ORAL DAILY
Qty: 30 TABLET | Refills: 2 | Status: SHIPPED | OUTPATIENT
Start: 2023-07-19 | End: 2023-08-18

## 2023-07-19 RX ORDER — MELOXICAM 7.5 MG/1
7.5 TABLET ORAL DAILY
Qty: 30 TABLET | Refills: 1 | Status: SHIPPED | OUTPATIENT
Start: 2023-07-19

## 2023-07-19 ASSESSMENT — FIBROSIS 4 INDEX: FIB4 SCORE: 1.1

## 2023-07-19 NOTE — PROGRESS NOTES
"Chief Complaint   Patient presents with    Back Pain    Other     Discuss hormones       HPI:  Kisha is a 53-year-old established female here with complaints of persistent on chronic and worsening right sided low back pain that radiates down front and back of leg x a few months.  Worse with sitting in car and lying flat in bed.  Denies significant pain with standing in one place for long periods of time.  Has felt unbalanced walking and even fallen.  Has tried PT in the past, last 3 years ago.    Negative x-ray of lumbar spine 2020.    Denies any other associated symptoms.  Leaving on a cruise with her  in October and would like help prior to the cruise as she will be doing a lot of hiking -requesting scopolamine patches for cruise.  She enjoys walking for exercise.    HRT: Feels irritable on 1 full pill of Estratest but has a low libido on 1 mg estrogen alone.  Would like advice on this.    Exam:  /86 (BP Location: Left arm, Patient Position: Sitting, BP Cuff Size: Adult)   Pulse 78   Temp 35.9 °C (96.6 °F)   Resp 12   Ht 1.651 m (5' 5\")   Wt 60.3 kg (133 lb)   SpO2 97%     Gen. appears healthy in no distress   Skin appropriate for sex and age   Neck trachea is midline  Lungs unlabored breathing  Heart regular rate  Neuro gait and station normal   Psych appropriate, calm, interactive, well-groomed        A/P:  1. Chronic bilateral low back pain with right-sided sciatica  DX-LUMBAR SPINE-2 OR 3 VIEWS    MR-LUMBAR SPINE-W/O    Referral to Physical Therapy    Referral to Pain Clinic    meloxicam (MOBIC) 7.5 MG Tab      2. Hormone replacement therapy (HRT)  esterified estrogen-methyltest (ESTRATEST) 1.25-2.5 MG Tab      3.     Seasickness      Recommend repeating x-ray as previous x-ray was 3 years ago and following up x-ray with MRI as well as physical therapy and physiatry consult.  She is allergic to sulfa and I refrained from Celebrex.  Trial of meloxicam 7.5 mg once daily in the morning with " food for 2 weeks and she will give me some feedback via [employer] in regards to any improvement in her discomfort.  We discussed stretching.  Encouraged her to refrain from lifting, pulling or pushing over 25 pounds.  Ultimately we will follow-up with each other over the next 1 to 2 months in regards to how she is doing with physical therapy, her imaging results and if she has met with physiatry yet.  She will contact me with any change in her symptoms in the interim.  Encouraged her to stop meloxicam if she begins to have heartburn or GI upset.    She also requested scopolamine patches for her pruritus which was sent to her pharmacy and we discussed how to use them as well as potential side effects.    We discussed hormone replacement therapy.  She has a low libido on estrogen alone but felt irritable on a full tablet of Estratest.  I encouraged her to try half pill Estratest and avoid taking any extra estrogen.  She will let me know how this is going.  Counseled regarding the risk versus benefit of postmenopausal hormone replacement therapy such as the potential increased risk of breast and gynecological cancers as well as blood clots and she verbalized understanding.  She has had a hysterectomy and does not need progesterone for uterine protection.

## 2023-08-18 ENCOUNTER — TELEPHONE (OUTPATIENT)
Dept: HEALTH INFORMATION MANAGEMENT | Facility: OTHER | Age: 53
End: 2023-08-18
Payer: COMMERCIAL

## 2023-08-21 ENCOUNTER — PATIENT MESSAGE (OUTPATIENT)
Dept: MEDICAL GROUP | Facility: LAB | Age: 53
End: 2023-08-21
Payer: COMMERCIAL

## 2023-08-21 DIAGNOSIS — F41.1 GAD (GENERALIZED ANXIETY DISORDER): ICD-10-CM

## 2023-08-21 DIAGNOSIS — E89.40 PREMATURE SURGICAL MENOPAUSE ON HRT: ICD-10-CM

## 2023-08-21 DIAGNOSIS — Z79.890 PREMATURE SURGICAL MENOPAUSE ON HRT: ICD-10-CM

## 2023-08-21 RX ORDER — ALPRAZOLAM 0.5 MG/1
0.5 TABLET ORAL
Qty: 30 TABLET | Refills: 1 | Status: SHIPPED | OUTPATIENT
Start: 2023-08-21 | End: 2023-08-22 | Stop reason: SDUPTHER

## 2023-08-22 ENCOUNTER — PATIENT MESSAGE (OUTPATIENT)
Dept: MEDICAL GROUP | Facility: LAB | Age: 53
End: 2023-08-22
Payer: COMMERCIAL

## 2023-08-22 DIAGNOSIS — F41.1 GAD (GENERALIZED ANXIETY DISORDER): ICD-10-CM

## 2023-08-22 RX ORDER — ESTRADIOL 0.5 MG/1
0.5 TABLET ORAL DAILY
Qty: 30 TABLET | Refills: 3 | Status: SHIPPED | OUTPATIENT
Start: 2023-08-22

## 2023-08-22 RX ORDER — ALPRAZOLAM 0.5 MG/1
0.5 TABLET ORAL
Qty: 30 TABLET | Refills: 1 | Status: SHIPPED | OUTPATIENT
Start: 2023-08-22 | End: 2023-10-24

## 2023-08-25 ENCOUNTER — PATIENT MESSAGE (OUTPATIENT)
Dept: MEDICAL GROUP | Facility: LAB | Age: 53
End: 2023-08-25
Payer: COMMERCIAL

## 2023-08-28 DIAGNOSIS — E89.40 PREMATURE SURGICAL MENOPAUSE ON HRT: ICD-10-CM

## 2023-08-28 DIAGNOSIS — Z79.890 PREMATURE SURGICAL MENOPAUSE ON HRT: ICD-10-CM

## 2023-08-28 RX ORDER — ESTRADIOL 1 MG/1
TABLET ORAL
Qty: 90 TABLET | Refills: 3 | Status: SHIPPED | OUTPATIENT
Start: 2023-08-28

## 2023-08-28 NOTE — TELEPHONE ENCOUNTER
Received request via: Pharmacy    Was the patient seen in the last year in this department? Yes    Does the patient have an active prescription (recently filled or refills available) for medication(s) requested? No    Does the patient have [facility] Plus and need 100 day supply (blood pressure, diabetes and cholesterol meds only)? Patient does not have SCP      ESTRADIOL 1MG TABS

## 2023-09-13 ENCOUNTER — APPOINTMENT (OUTPATIENT)
Dept: RADIOLOGY | Facility: MEDICAL CENTER | Age: 53
End: 2023-09-13
Attending: NURSE PRACTITIONER
Payer: COMMERCIAL

## 2023-10-24 DIAGNOSIS — F41.1 GAD (GENERALIZED ANXIETY DISORDER): ICD-10-CM

## 2023-10-24 RX ORDER — ALPRAZOLAM 0.5 MG/1
0.5 TABLET ORAL
Qty: 30 TABLET | Refills: 1 | Status: SHIPPED | OUTPATIENT
Start: 2023-10-24 | End: 2023-11-23

## 2023-10-24 NOTE — TELEPHONE ENCOUNTER
Received request via: Pharmacy    Was the patient seen in the last year in this department? Yes  LOV 07/19/2023  Does the patient have an active prescription (recently filled or refills available) for medication(s) requested? No    Does the patient have [facility] Plus and need 100 day supply (blood pressure, diabetes and cholesterol meds only)? Medication is not for cholesterol, blood pressure or diabetes and Patient does not have SCP

## 2024-01-05 DIAGNOSIS — F41.1 GAD (GENERALIZED ANXIETY DISORDER): ICD-10-CM

## 2024-01-08 RX ORDER — ALPRAZOLAM 0.5 MG/1
0.5 TABLET ORAL
Qty: 30 TABLET | Refills: 1 | Status: SHIPPED | OUTPATIENT
Start: 2024-01-08 | End: 2024-02-01

## 2024-01-31 DIAGNOSIS — F41.1 GAD (GENERALIZED ANXIETY DISORDER): ICD-10-CM

## 2024-02-01 RX ORDER — ALPRAZOLAM 0.5 MG/1
0.5 TABLET ORAL
Qty: 15 TABLET | Refills: 0 | Status: SHIPPED | OUTPATIENT
Start: 2024-02-01 | End: 2024-02-16

## 2024-03-29 DIAGNOSIS — F41.1 GAD (GENERALIZED ANXIETY DISORDER): ICD-10-CM

## 2024-03-29 NOTE — TELEPHONE ENCOUNTER
Received request via: Pharmacy    Was the patient seen in the last year in this department? Yes    Does the patient have an active prescription (recently filled or refills available) for medication(s) requested? No    [facility]    Does the patient have [facility] and need 100 day supply (blood pressure, diabetes and cholesterol meds only)? Patient does not have SCP

## 2024-03-31 RX ORDER — ALPRAZOLAM 0.5 MG/1
0.5 TABLET ORAL
Qty: 30 TABLET | Refills: 1 | Status: SHIPPED | OUTPATIENT
Start: 2024-03-31 | End: 2024-04-30

## 2024-05-15 ENCOUNTER — PATIENT MESSAGE (OUTPATIENT)
Dept: MEDICAL GROUP | Facility: LAB | Age: 54
End: 2024-05-15

## 2024-05-15 DIAGNOSIS — N76.0 VAGINITIS: ICD-10-CM

## 2024-05-15 RX ORDER — FLUCONAZOLE 150 MG/1
150 TABLET ORAL ONCE
Qty: 2 TABLET | Refills: 0 | Status: SHIPPED | OUTPATIENT
Start: 2024-05-15 | End: 2024-05-15

## 2024-05-31 DIAGNOSIS — F41.1 GAD (GENERALIZED ANXIETY DISORDER): ICD-10-CM

## 2024-05-31 NOTE — TELEPHONE ENCOUNTER
Received request via: Pharmacy    Was the patient seen in the last year in this department? Yes  7/19/23  Does the patient have an active prescription (recently filled or refills available) for medication(s) requested? No    [facility]    Does the patient have [facility] Plus and need 100 day supply (blood pressure, diabetes and cholesterol meds only)? Medication is not for cholesterol, blood pressure or diabetes

## 2024-06-03 RX ORDER — ALPRAZOLAM 0.5 MG/1
0.5 TABLET ORAL
Qty: 15 TABLET | Refills: 0 | Status: SHIPPED | OUTPATIENT
Start: 2024-06-03 | End: 2024-06-05 | Stop reason: SDUPTHER

## 2024-06-05 ENCOUNTER — OFFICE VISIT (OUTPATIENT)
Dept: MEDICAL GROUP | Facility: LAB | Age: 54
End: 2024-06-05
Payer: COMMERCIAL

## 2024-06-05 VITALS
SYSTOLIC BLOOD PRESSURE: 130 MMHG | TEMPERATURE: 98.1 F | HEIGHT: 65 IN | HEART RATE: 82 BPM | RESPIRATION RATE: 12 BRPM | DIASTOLIC BLOOD PRESSURE: 80 MMHG | BODY MASS INDEX: 22.99 KG/M2 | OXYGEN SATURATION: 97 % | WEIGHT: 138 LBS

## 2024-06-05 DIAGNOSIS — F41.1 GAD (GENERALIZED ANXIETY DISORDER): ICD-10-CM

## 2024-06-05 DIAGNOSIS — R68.82 LOW LIBIDO: ICD-10-CM

## 2024-06-05 DIAGNOSIS — Z12.12 SCREENING FOR COLORECTAL CANCER: ICD-10-CM

## 2024-06-05 DIAGNOSIS — B00.9 HSV-1 (HERPES SIMPLEX VIRUS 1) INFECTION: ICD-10-CM

## 2024-06-05 DIAGNOSIS — Z12.11 SCREENING FOR COLORECTAL CANCER: ICD-10-CM

## 2024-06-05 DIAGNOSIS — Z00.00 PREVENTATIVE HEALTH CARE: ICD-10-CM

## 2024-06-05 DIAGNOSIS — N76.0 ACUTE VAGINITIS: ICD-10-CM

## 2024-06-05 DIAGNOSIS — A04.8 H. PYLORI INFECTION: ICD-10-CM

## 2024-06-05 DIAGNOSIS — R19.5 FECAL OCCULT BLOOD TEST POSITIVE: ICD-10-CM

## 2024-06-05 DIAGNOSIS — R10.2 PELVIC PAIN: ICD-10-CM

## 2024-06-05 PROCEDURE — 3075F SYST BP GE 130 - 139MM HG: CPT | Performed by: NURSE PRACTITIONER

## 2024-06-05 PROCEDURE — 3079F DIAST BP 80-89 MM HG: CPT | Performed by: NURSE PRACTITIONER

## 2024-06-05 PROCEDURE — 99214 OFFICE O/P EST MOD 30 MIN: CPT | Performed by: NURSE PRACTITIONER

## 2024-06-05 RX ORDER — TESTOSTERONE MICRONIZED 100 %
POWDER (GRAM) MISCELLANEOUS
Qty: 45 G | Refills: 2 | Status: SHIPPED | OUTPATIENT
Start: 2024-06-05 | End: 2024-09-04

## 2024-06-05 RX ORDER — VALACYCLOVIR HYDROCHLORIDE 1 G/1
TABLET, FILM COATED ORAL
Qty: 90 TABLET | Refills: 2 | Status: SHIPPED | OUTPATIENT
Start: 2024-06-05

## 2024-06-05 RX ORDER — ALPRAZOLAM 0.5 MG/1
0.5 TABLET ORAL
Qty: 30 TABLET | Refills: 2 | Status: SHIPPED | OUTPATIENT
Start: 2024-06-05 | End: 2024-07-05

## 2024-06-05 ASSESSMENT — PATIENT HEALTH QUESTIONNAIRE - PHQ9: CLINICAL INTERPRETATION OF PHQ2 SCORE: 0

## 2024-06-06 ENCOUNTER — HOSPITAL ENCOUNTER (OUTPATIENT)
Dept: LAB | Facility: MEDICAL CENTER | Age: 54
End: 2024-06-06
Attending: NURSE PRACTITIONER
Payer: COMMERCIAL

## 2024-06-06 DIAGNOSIS — A04.8 H. PYLORI INFECTION: ICD-10-CM

## 2024-06-06 PROCEDURE — 83013 H PYLORI (C-13) BREATH: CPT

## 2024-06-06 NOTE — PROGRESS NOTES
Verbal consent was acquired by the patient to use [employer] ambient listening note generation during this visit Yes      Subjective   Kisha Infante is a 53 y.o. female who presents for follow-up  History of Present Illness  The patient is a 53-year-old established female here for follow-up.      The patient recently underwent stool laboratory tests, as ordered by her niece's friend's dietician. She was advised to incorporate fish, chicken, and berberine into her diet. Upon reviewing the results, she consulted with her naturopathic physician, Dr. Alcala who encouraged her to follow-up here.  Stool testing came back positive for H. pylori and blood.  She has been postponing a colonoscopy but is now willing to go. Her primary complaint is bloating and nausea, but she denies experiencing heartburn. She has not traveled in a third country in the past 5 years and has not observed any blood in her stool.  She avoids gluten.  She is not currently taking Prilosec or Pepcid. Her last lab panel was conducted 2022. She typically empties her bowels in the morning.  She takes a laxative nightly.    She is requesting a referral to a pelvic floor physical therapist. She experiences a sensation akin to a bladder infection if she exercises or performs core exercises for 3 consecutive days, accompanied by mild pelvic pain. She is currently taking estrogen 1 mg. She previously took both estrogen and Estratest, but experienced increased irritability on Estratest, prompting her to return to her regular regimen.  She does have a lower libido off of testosterone.  She has had a total hysterectomy and has a known scar tissue.  Has chronic pelvic pain after working out or with any constipation.    She has been having some vaginal itching which she thought was a yeast infection.  Symptoms are persistent.      Review of Systems  Negative except for HPI  Objective   /80 (BP Location: Left arm, Patient Position: Sitting, BP Cuff  "Size: Adult)   Pulse 82   Temp 36.7 °C (98.1 °F)   Resp 12   Ht 1.651 m (5' 5\")   Wt 62.6 kg (138 lb)   SpO2 97%   Physical Exam  Gen. appears healthy in no distress   Skin appropriate for sex and age   Neck trachea is midline  Lungs unlabored breathing  Heart regular rate  Neuro gait and station normal   Psych appropriate, calm, interactive, well-groomed    Results  Laboratory Studies  Stool sample was empty. H. pylori test was positive. Fecal occult positive H. pylori. IgA antibodies were elevated.       Assessment & Plan  1. H. pylori infection.  Stool testing done through a dietitian showed H. pylori.  Recommend confirmation with A breath test to confirm the diagnosis. In the event of a positive H. pylori result, the patient will be prescribed omeprazole twice daily, clarithromycin and amoxicillin.  Discussed H. pylori.    2.  Positive fecal occult blood test: Referred for colonoscopy.  Fortunately she does not see blood in her stool.  She does not have unintentional weight loss.  She does have chronic pelvic pain but not chronic abdominal pain.    3.  Anxiety: Refilled alprazolam.  Reviewed  profile.  She understands that she should not take Xanax within 6 hours of driving a car, operating machinery or drinking alcohol.  She understands the chemically dependent nature of Xanax.    4.  She would like to work with the pelvic floor physical therapist in regards to her chronic pelvic pain especially if she is dealing with constipation or if she exercises on a regular basis and a referral was placed to pelvic floor physical therapy.    5.  Vaginitis: Unfortunately she left prior to collecting a vaginal swab.  She may return for this tomorrow and I will send her a MyChart.     6.  Low libido: Faxed a prescription for topical testosterone to [facility] pharmacy to help with her libido.  She will stop this after 1 month if not helpful.  She continues on 1 mg estradiol daily.  Counseled regarding the " risk versus benefit of postmenopausal hormone replacement therapy such as the potential increased risk of breast and gynecological cancers as well as blood clots and she verbalized understanding.  Encouraged her to schedule her mammogram.              Please note that this dictation was created using voice recognition software. I have made every reasonable attempt to correct obvious errors, but I expect that there are errors of grammar and possibly content that I did not discover before finalizing the note.

## 2024-06-07 ENCOUNTER — HOSPITAL ENCOUNTER (OUTPATIENT)
Dept: LAB | Facility: MEDICAL CENTER | Age: 54
End: 2024-06-07
Attending: NURSE PRACTITIONER
Payer: COMMERCIAL

## 2024-06-07 DIAGNOSIS — Z00.00 PREVENTATIVE HEALTH CARE: ICD-10-CM

## 2024-06-07 LAB
ALBUMIN SERPL BCP-MCNC: 4.3 G/DL (ref 3.2–4.9)
ALBUMIN/GLOB SERPL: 1.7 G/DL
ALP SERPL-CCNC: 74 U/L (ref 30–99)
ALT SERPL-CCNC: 15 U/L (ref 2–50)
ANION GAP SERPL CALC-SCNC: 13 MMOL/L (ref 7–16)
AST SERPL-CCNC: 18 U/L (ref 12–45)
BASOPHILS # BLD AUTO: 0.7 % (ref 0–1.8)
BASOPHILS # BLD: 0.03 K/UL (ref 0–0.12)
BILIRUB SERPL-MCNC: 0.4 MG/DL (ref 0.1–1.5)
BUN SERPL-MCNC: 13 MG/DL (ref 8–22)
CALCIUM ALBUM COR SERPL-MCNC: 9 MG/DL (ref 8.5–10.5)
CALCIUM SERPL-MCNC: 9.2 MG/DL (ref 8.5–10.5)
CHLORIDE SERPL-SCNC: 104 MMOL/L (ref 96–112)
CHOLEST SERPL-MCNC: 243 MG/DL (ref 100–199)
CO2 SERPL-SCNC: 23 MMOL/L (ref 20–33)
CREAT SERPL-MCNC: 0.86 MG/DL (ref 0.5–1.4)
EOSINOPHIL # BLD AUTO: 0.13 K/UL (ref 0–0.51)
EOSINOPHIL NFR BLD: 2.9 % (ref 0–6.9)
ERYTHROCYTE [DISTWIDTH] IN BLOOD BY AUTOMATED COUNT: 43.8 FL (ref 35.9–50)
FASTING STATUS PATIENT QL REPORTED: NORMAL
GFR SERPLBLD CREATININE-BSD FMLA CKD-EPI: 81 ML/MIN/1.73 M 2
GLOBULIN SER CALC-MCNC: 2.6 G/DL (ref 1.9–3.5)
GLUCOSE SERPL-MCNC: 78 MG/DL (ref 65–99)
HCT VFR BLD AUTO: 44.7 % (ref 37–47)
HDLC SERPL-MCNC: 110 MG/DL
HGB BLD-MCNC: 14.7 G/DL (ref 12–16)
IMM GRANULOCYTES # BLD AUTO: 0 K/UL (ref 0–0.11)
IMM GRANULOCYTES NFR BLD AUTO: 0 % (ref 0–0.9)
LDLC SERPL CALC-MCNC: 114 MG/DL
LYMPHOCYTES # BLD AUTO: 1.65 K/UL (ref 1–4.8)
LYMPHOCYTES NFR BLD: 36.4 % (ref 22–41)
MCH RBC QN AUTO: 30.8 PG (ref 27–33)
MCHC RBC AUTO-ENTMCNC: 32.9 G/DL (ref 32.2–35.5)
MCV RBC AUTO: 93.7 FL (ref 81.4–97.8)
MONOCYTES # BLD AUTO: 0.34 K/UL (ref 0–0.85)
MONOCYTES NFR BLD AUTO: 7.5 % (ref 0–13.4)
NEUTROPHILS # BLD AUTO: 2.38 K/UL (ref 1.82–7.42)
NEUTROPHILS NFR BLD: 52.5 % (ref 44–72)
NRBC # BLD AUTO: 0 K/UL
NRBC BLD-RTO: 0 /100 WBC (ref 0–0.2)
PLATELET # BLD AUTO: 244 K/UL (ref 164–446)
PMV BLD AUTO: 11.2 FL (ref 9–12.9)
POTASSIUM SERPL-SCNC: 4.2 MMOL/L (ref 3.6–5.5)
PROT SERPL-MCNC: 6.9 G/DL (ref 6–8.2)
RBC # BLD AUTO: 4.77 M/UL (ref 4.2–5.4)
SODIUM SERPL-SCNC: 140 MMOL/L (ref 135–145)
TRIGL SERPL-MCNC: 96 MG/DL (ref 0–149)
UREA BREATH TEST QL: NEGATIVE
WBC # BLD AUTO: 4.5 K/UL (ref 4.8–10.8)

## 2024-06-07 PROCEDURE — 36415 COLL VENOUS BLD VENIPUNCTURE: CPT

## 2024-06-07 PROCEDURE — 82306 VITAMIN D 25 HYDROXY: CPT

## 2024-06-07 PROCEDURE — 85025 COMPLETE CBC W/AUTO DIFF WBC: CPT

## 2024-06-07 PROCEDURE — 80061 LIPID PANEL: CPT

## 2024-06-07 PROCEDURE — 80053 COMPREHEN METABOLIC PANEL: CPT

## 2024-06-07 PROCEDURE — 84443 ASSAY THYROID STIM HORMONE: CPT

## 2024-06-08 LAB
25(OH)D3 SERPL-MCNC: 46 NG/ML (ref 30–100)
TSH SERPL DL<=0.005 MIU/L-ACNC: 3.38 UIU/ML (ref 0.38–5.33)

## 2024-06-18 ENCOUNTER — APPOINTMENT (OUTPATIENT)
Dept: RADIOLOGY | Facility: MEDICAL CENTER | Age: 54
End: 2024-06-18
Attending: NURSE PRACTITIONER
Payer: COMMERCIAL

## 2024-07-18 ENCOUNTER — OFFICE VISIT (OUTPATIENT)
Dept: MEDICAL GROUP | Facility: LAB | Age: 54
End: 2024-07-18
Payer: COMMERCIAL

## 2024-07-18 VITALS
WEIGHT: 141 LBS | HEART RATE: 88 BPM | TEMPERATURE: 96.7 F | OXYGEN SATURATION: 95 % | RESPIRATION RATE: 12 BRPM | DIASTOLIC BLOOD PRESSURE: 78 MMHG | HEIGHT: 65 IN | BODY MASS INDEX: 23.49 KG/M2 | SYSTOLIC BLOOD PRESSURE: 120 MMHG

## 2024-07-18 DIAGNOSIS — J02.9 ACUTE PHARYNGITIS, UNSPECIFIED ETIOLOGY: ICD-10-CM

## 2024-07-18 DIAGNOSIS — Z79.890 PREMATURE SURGICAL MENOPAUSE ON HRT: ICD-10-CM

## 2024-07-18 DIAGNOSIS — E89.40 PREMATURE SURGICAL MENOPAUSE ON HRT: ICD-10-CM

## 2024-07-18 DIAGNOSIS — T38.7X5A ADVERSE EFFECT OF TESTOSTERONE, INITIAL ENCOUNTER: ICD-10-CM

## 2024-07-18 DIAGNOSIS — R10.30 LOWER ABDOMINAL PAIN: ICD-10-CM

## 2024-07-18 DIAGNOSIS — T63.304A SPIDER BITE WOUND, UNDETERMINED INTENT, INITIAL ENCOUNTER: ICD-10-CM

## 2024-07-18 LAB — S PYO DNA SPEC NAA+PROBE: NOT DETECTED

## 2024-07-18 PROCEDURE — 87651 STREP A DNA AMP PROBE: CPT | Performed by: NURSE PRACTITIONER

## 2024-07-18 PROCEDURE — 3078F DIAST BP <80 MM HG: CPT | Performed by: NURSE PRACTITIONER

## 2024-07-18 PROCEDURE — 99214 OFFICE O/P EST MOD 30 MIN: CPT | Performed by: NURSE PRACTITIONER

## 2024-07-18 PROCEDURE — 3074F SYST BP LT 130 MM HG: CPT | Performed by: NURSE PRACTITIONER

## 2024-07-18 ASSESSMENT — FIBROSIS 4 INDEX: FIB4 SCORE: 0.99

## 2024-08-21 DIAGNOSIS — Z79.890 PREMATURE SURGICAL MENOPAUSE ON HRT: ICD-10-CM

## 2024-08-21 DIAGNOSIS — E89.40 PREMATURE SURGICAL MENOPAUSE ON HRT: ICD-10-CM

## 2024-08-21 RX ORDER — ESTRADIOL 1 MG/1
TABLET ORAL
Qty: 90 TABLET | Refills: 3 | Status: SHIPPED | OUTPATIENT
Start: 2024-08-21

## 2024-09-21 NOTE — OP THERAPY DAILY TREATMENT
Outpatient Physical Therapy  DAILY TREATMENT     [facility] [place]  [address], [address]  [place] [address]  Phone:  [phone]    Date: 08/03/2020    Patient: Kisha Infante  YOB: 1970  MRN: [id number]     Time Calculation                   Chief Complaint: No chief complaint on file.    Visit #: 3    SUBJECTIVE: better , driving remains most aggravating       OBJECTIVE:  Current objective measures:           Therapeutic Exercises (CPT 05600):     1. Biofeedback with cuff BKFO and march, increase symptoms with march right hip    2. REIL, 2 x 10    3. Posture re ed lumbar roll with driving and sitting    4. Ball squat hip hinge, x 20    5. Bridge on ball, 10 x 15 sec    6. Ski jumper, 2 x 30 sec    Therapeutic Treatments and Modalities:     1. Manual Therapy (CPT 44821), clinician overpressure with REIL and prone 2 x 10    2. Neuromuscular Re-education (CPT 29433),  body mechanics with standing at sink/neutral pelvis maintaining TA contraction prior to lifting    Time-based treatments/modalities:           ASSESSMENT:   Response to treatment: asymptomatic throughout session , REIL is very effective at home of reducing symptoms .  Patient instructed to try hiking short distance and monitor symptoms    PLAN/RECOMMENDATIONS:   Plan for treatment: therapy treatment to continue next visit.  Planned interventions for next visit: continue with current treatment.       
21-Sep-2024 20:19

## 2024-10-01 DIAGNOSIS — F41.1 GAD (GENERALIZED ANXIETY DISORDER): ICD-10-CM

## 2024-10-02 RX ORDER — ALPRAZOLAM 0.5 MG
0.5 TABLET ORAL NIGHTLY PRN
Qty: 30 TABLET | Refills: 2 | Status: SHIPPED | OUTPATIENT
Start: 2024-10-02 | End: 2024-11-01

## 2024-10-10 ENCOUNTER — PATIENT MESSAGE (OUTPATIENT)
Dept: HEALTH INFORMATION MANAGEMENT | Facility: OTHER | Age: 54
End: 2024-10-10

## 2024-10-29 ENCOUNTER — HOSPITAL ENCOUNTER (OUTPATIENT)
Dept: RADIOLOGY | Facility: MEDICAL CENTER | Age: 54
End: 2024-10-29
Attending: NURSE PRACTITIONER
Payer: COMMERCIAL

## 2024-10-29 DIAGNOSIS — Z12.31 VISIT FOR SCREENING MAMMOGRAM: ICD-10-CM

## 2024-10-29 PROCEDURE — 77067 SCR MAMMO BI INCL CAD: CPT

## 2024-11-26 ENCOUNTER — PATIENT MESSAGE (OUTPATIENT)
Dept: MEDICAL GROUP | Facility: LAB | Age: 54
End: 2024-11-26
Payer: COMMERCIAL

## 2024-11-26 DIAGNOSIS — F41.1 GAD (GENERALIZED ANXIETY DISORDER): ICD-10-CM

## 2024-11-26 RX ORDER — ALPRAZOLAM 0.5 MG
0.5 TABLET ORAL NIGHTLY PRN
Qty: 30 TABLET | Refills: 0 | Status: SHIPPED | OUTPATIENT
Start: 2024-11-26 | End: 2024-12-26

## 2024-12-26 DIAGNOSIS — F41.1 GAD (GENERALIZED ANXIETY DISORDER): ICD-10-CM

## 2024-12-27 RX ORDER — ALPRAZOLAM 0.5 MG
0.5 TABLET ORAL NIGHTLY PRN
Qty: 30 TABLET | Refills: 0 | Status: SHIPPED | OUTPATIENT
Start: 2024-12-27 | End: 2025-01-26

## 2024-12-27 NOTE — TELEPHONE ENCOUNTER
Received request via: Pharmacy    Was the patient seen in the last year in this department? Yes    Does the patient have an active prescription (recently filled or refills available) for medication(s) requested? No    [facility]     Does the patient have [facility] Plus and need 100-day supply? (This applies to ALL medications) Patient does not have SCP

## 2025-01-20 ENCOUNTER — APPOINTMENT (OUTPATIENT)
Dept: MEDICAL GROUP | Facility: LAB | Age: 55
End: 2025-01-20
Payer: COMMERCIAL

## 2025-01-28 DIAGNOSIS — F41.1 GAD (GENERALIZED ANXIETY DISORDER): ICD-10-CM

## 2025-01-29 RX ORDER — ALPRAZOLAM 0.5 MG
TABLET ORAL
Qty: 30 TABLET | Refills: 0 | Status: SHIPPED | OUTPATIENT
Start: 2025-01-29 | End: 2025-02-28

## 2025-02-25 ENCOUNTER — HOSPITAL ENCOUNTER (OUTPATIENT)
Dept: LAB | Facility: MEDICAL CENTER | Age: 55
End: 2025-02-25
Attending: NURSE PRACTITIONER
Payer: COMMERCIAL

## 2025-02-25 ENCOUNTER — OFFICE VISIT (OUTPATIENT)
Dept: MEDICAL GROUP | Facility: LAB | Age: 55
End: 2025-02-25
Payer: COMMERCIAL

## 2025-02-25 VITALS
BODY MASS INDEX: 23.82 KG/M2 | RESPIRATION RATE: 12 BRPM | OXYGEN SATURATION: 97 % | DIASTOLIC BLOOD PRESSURE: 82 MMHG | HEART RATE: 68 BPM | SYSTOLIC BLOOD PRESSURE: 118 MMHG | WEIGHT: 143 LBS | HEIGHT: 65 IN | TEMPERATURE: 96.9 F

## 2025-02-25 DIAGNOSIS — R68.82 LOW LIBIDO: ICD-10-CM

## 2025-02-25 DIAGNOSIS — F41.1 GAD (GENERALIZED ANXIETY DISORDER): ICD-10-CM

## 2025-02-25 DIAGNOSIS — R63.5 WEIGHT GAIN: ICD-10-CM

## 2025-02-25 DIAGNOSIS — M79.672 BILATERAL FOOT PAIN: ICD-10-CM

## 2025-02-25 DIAGNOSIS — Z79.890 HORMONE REPLACEMENT THERAPY (HRT): ICD-10-CM

## 2025-02-25 DIAGNOSIS — R53.83 OTHER FATIGUE: ICD-10-CM

## 2025-02-25 DIAGNOSIS — M79.671 BILATERAL FOOT PAIN: ICD-10-CM

## 2025-02-25 DIAGNOSIS — Z00.00 WELL ADULT EXAM: ICD-10-CM

## 2025-02-25 LAB
ALBUMIN SERPL BCP-MCNC: 4.2 G/DL (ref 3.2–4.9)
ALBUMIN/GLOB SERPL: 1.5 G/DL
ALP SERPL-CCNC: 69 U/L (ref 30–99)
ALT SERPL-CCNC: 16 U/L (ref 2–50)
ANION GAP SERPL CALC-SCNC: 11 MMOL/L (ref 7–16)
AST SERPL-CCNC: 20 U/L (ref 12–45)
BASOPHILS # BLD AUTO: 0.5 % (ref 0–1.8)
BASOPHILS # BLD: 0.03 K/UL (ref 0–0.12)
BILIRUB SERPL-MCNC: 0.2 MG/DL (ref 0.1–1.5)
BUN SERPL-MCNC: 15 MG/DL (ref 8–22)
CALCIUM ALBUM COR SERPL-MCNC: 8.9 MG/DL (ref 8.5–10.5)
CALCIUM SERPL-MCNC: 9.1 MG/DL (ref 8.5–10.5)
CHLORIDE SERPL-SCNC: 104 MMOL/L (ref 96–112)
CO2 SERPL-SCNC: 24 MMOL/L (ref 20–33)
CREAT SERPL-MCNC: 0.81 MG/DL (ref 0.5–1.4)
EOSINOPHIL # BLD AUTO: 0.07 K/UL (ref 0–0.51)
EOSINOPHIL NFR BLD: 1.2 % (ref 0–6.9)
ERYTHROCYTE [DISTWIDTH] IN BLOOD BY AUTOMATED COUNT: 44.7 FL (ref 35.9–50)
ESTRADIOL SERPL-MCNC: 73.9 PG/ML
GFR SERPLBLD CREATININE-BSD FMLA CKD-EPI: 87 ML/MIN/1.73 M 2
GLOBULIN SER CALC-MCNC: 2.8 G/DL (ref 1.9–3.5)
GLUCOSE SERPL-MCNC: 97 MG/DL (ref 65–99)
HCT VFR BLD AUTO: 41.7 % (ref 37–47)
HGB BLD-MCNC: 13.7 G/DL (ref 12–16)
IMM GRANULOCYTES # BLD AUTO: 0.01 K/UL (ref 0–0.11)
IMM GRANULOCYTES NFR BLD AUTO: 0.2 % (ref 0–0.9)
IRON SATN MFR SERPL: 31 % (ref 15–55)
IRON SERPL-MCNC: 91 UG/DL (ref 40–170)
LYMPHOCYTES # BLD AUTO: 1.91 K/UL (ref 1–4.8)
LYMPHOCYTES NFR BLD: 33.1 % (ref 22–41)
MCH RBC QN AUTO: 31.4 PG (ref 27–33)
MCHC RBC AUTO-ENTMCNC: 32.9 G/DL (ref 32.2–35.5)
MCV RBC AUTO: 95.4 FL (ref 81.4–97.8)
MONOCYTES # BLD AUTO: 0.45 K/UL (ref 0–0.85)
MONOCYTES NFR BLD AUTO: 7.8 % (ref 0–13.4)
NEUTROPHILS # BLD AUTO: 3.3 K/UL (ref 1.82–7.42)
NEUTROPHILS NFR BLD: 57.2 % (ref 44–72)
NRBC # BLD AUTO: 0 K/UL
NRBC BLD-RTO: 0 /100 WBC (ref 0–0.2)
PLATELET # BLD AUTO: 223 K/UL (ref 164–446)
PMV BLD AUTO: 10.2 FL (ref 9–12.9)
POTASSIUM SERPL-SCNC: 4.1 MMOL/L (ref 3.6–5.5)
PROGEST SERPL-MCNC: <0.1 NG/ML
PROT SERPL-MCNC: 7 G/DL (ref 6–8.2)
RBC # BLD AUTO: 4.37 M/UL (ref 4.2–5.4)
SODIUM SERPL-SCNC: 139 MMOL/L (ref 135–145)
T4 FREE SERPL-MCNC: 0.89 NG/DL (ref 0.93–1.7)
TIBC SERPL-MCNC: 296 UG/DL (ref 250–450)
TSH SERPL-ACNC: 2.64 UIU/ML (ref 0.35–5.5)
UIBC SERPL-MCNC: 205 UG/DL (ref 110–370)
WBC # BLD AUTO: 5.8 K/UL (ref 4.8–10.8)

## 2025-02-25 PROCEDURE — 83550 IRON BINDING TEST: CPT

## 2025-02-25 PROCEDURE — 3074F SYST BP LT 130 MM HG: CPT | Performed by: NURSE PRACTITIONER

## 2025-02-25 PROCEDURE — 99396 PREV VISIT EST AGE 40-64: CPT | Performed by: NURSE PRACTITIONER

## 2025-02-25 PROCEDURE — 3079F DIAST BP 80-89 MM HG: CPT | Performed by: NURSE PRACTITIONER

## 2025-02-25 PROCEDURE — 84402 ASSAY OF FREE TESTOSTERONE: CPT

## 2025-02-25 PROCEDURE — 84443 ASSAY THYROID STIM HORMONE: CPT

## 2025-02-25 PROCEDURE — 80053 COMPREHEN METABOLIC PANEL: CPT

## 2025-02-25 PROCEDURE — 82670 ASSAY OF TOTAL ESTRADIOL: CPT

## 2025-02-25 PROCEDURE — 85025 COMPLETE CBC W/AUTO DIFF WBC: CPT

## 2025-02-25 PROCEDURE — 83540 ASSAY OF IRON: CPT

## 2025-02-25 PROCEDURE — 84403 ASSAY OF TOTAL TESTOSTERONE: CPT

## 2025-02-25 PROCEDURE — 36415 COLL VENOUS BLD VENIPUNCTURE: CPT

## 2025-02-25 PROCEDURE — 84270 ASSAY OF SEX HORMONE GLOBUL: CPT

## 2025-02-25 PROCEDURE — 84439 ASSAY OF FREE THYROXINE: CPT

## 2025-02-25 PROCEDURE — 84144 ASSAY OF PROGESTERONE: CPT

## 2025-02-25 RX ORDER — ALPRAZOLAM 0.5 MG
0.5 TABLET ORAL NIGHTLY PRN
Qty: 30 TABLET | Refills: 2 | Status: SHIPPED | OUTPATIENT
Start: 2025-02-25 | End: 2025-03-27

## 2025-02-25 ASSESSMENT — FIBROSIS 4 INDEX: FIB4 SCORE: 0.99

## 2025-02-25 ASSESSMENT — PATIENT HEALTH QUESTIONNAIRE - PHQ9: CLINICAL INTERPRETATION OF PHQ2 SCORE: 0

## 2025-02-25 NOTE — PROGRESS NOTES
Verbal consent was acquired by the patient to use [employer] ambient listening note generation during this visit Yes      Subjective   Kisha Infante is a 54 y.o. female who presents for annual exam.  History of Present Illness  Kisha is here for annual exam.   Main concerns are fatigue / foot pain. Has had a hysterectomy.  On 1 mg estrogen orally.    She reports experiencing bilateral foot pain, with the left foot being more severely affected -present for about 6 months without injury. The pain is described as an ache, particularly intense during bedtime and upon waking.  She has not sought consultation from a podiatrist or orthopedic specialist. She prefers barefoot shoes over high-top shoes, which she finds uncomfortable due to perceived circulation restriction. She has attempted to use Skechers shoes but discontinued due to discomfort. The onset of these symptoms was approximately 6 months ago, coinciding with an increase in her walking activity during the summer. She reports no specific injury to her feet. Despite attempts at self-management through chiropractic adjustments, the pain persists.    She is currently on a regimen of oral estrogen 1 mg daily, without progesterone, following a hysterectomy. She reports a significant decrease in energy levels over the past 6 months.  She used to be able to go to hot yoga every single day and come home and clean her house and garage,  the kids. If she does one of those things now, she feels tired.  Recently she mainly wants to rest on the couch.  She just feels like her energy is low.  She reports no gastrointestinal issues such as constipation, diarrhea, or urinary incontinence. She also reports dry skin and eyes, for which she uses a heavier moisturizer.  She has a very low libido and testosterone was not helpful for that.  She reports mild vaginal dryness. She takes the estrogen pill daily. She is not a vegetarian and reports no blood loss, including  "hematuria, hematochezia, vaginal bleeding, or epistaxis. She underwent a colonoscopy in 11/2024, during which 2 polyps were removed, and she was advised to repeat the procedure in 5 years.    She continues to take Xanax for sleep, which allows her to achieve 7 to 8 hours of sleep per night. She reports feeling refreshed upon waking but experiences physical exhaustion throughout the day. She does not believe she is depressed and enjoys weekend trips to [facility] with her . She attempted to resume hot yoga 6 months ago but found it excessively tiring. She is uncertain about snoring and plans to discuss this with her . Her Oura ring scores her sleep quality at 82. She reports no ankle swelling, shortness of breath, dizziness, lightheadedness, abnormal heartbeats, or severe headaches.    ALLERGIES  The patient is allergic to CODEINE and SULFA.    Review of Systems  Negative except for HPI  Objective   /82 (BP Location: Left arm, Patient Position: Sitting, BP Cuff Size: Adult)   Pulse 68   Temp 36.1 °C (96.9 °F)   Resp 12   Ht 1.651 m (5' 5\")   Wt 64.9 kg (143 lb)   SpO2 97%   Physical Exam  Well developed, well nourished female in no apparent distress.  Eyes: Conjunctivae and sclerae are clear and non-icteric. Pupils are equally round and reactive to light, extra-ocular movements intact.   ENT: Nares are patent and without discharge. Oropharynx is clear and without erythema or exudates. Buccal mucosa is moist.  Neck: Supple; there is no adenopathy. No supraclavicular adenopathy is noted.  CV: Heart is regular without murmur, rub or gallop.  Pulm: Clear to auscultation.  GI: Abdomen is soft, non-tender,  with normoactive bowel sounds in all four quadrants. No hepatosplenomegaly is appreciated. No masses are palpated. No guarding or rebound is noted.  Psychiatric: The patient is alert and oriented in all four spheres. Mood is euthymic. Affect is appropriate for the situation.  Skin: No rashes " "were noted.  Musculoskeletal: Gait is normal. Patient is able to transfer from sitting position to exam table without assistance.         Assessment & Plan  \"  1. Well adult exam        2. Bilateral foot pain  DX-FOOT-COMPLETE 3+ RIGHT    DX-FOOT-COMPLETE 3+ LEFT    TSH    FREE THYROXINE    ESTRADIOL    TESTOSTERONE F&T FEMALES/CHILD    PROGESTERONE    CBC WITH DIFFERENTIAL    IRON/TOTAL IRON BIND    Comp Metabolic Panel      3. Other fatigue  TSH    FREE THYROXINE    ESTRADIOL    TESTOSTERONE F&T FEMALES/CHILD    PROGESTERONE    CBC WITH DIFFERENTIAL    IRON/TOTAL IRON BIND    Comp Metabolic Panel      4. Low libido  TSH    FREE THYROXINE    ESTRADIOL    TESTOSTERONE F&T FEMALES/CHILD    PROGESTERONE    CBC WITH DIFFERENTIAL    IRON/TOTAL IRON BIND    Comp Metabolic Panel      5. Weight gain  TSH    FREE THYROXINE    ESTRADIOL    TESTOSTERONE F&T FEMALES/CHILD    PROGESTERONE    CBC WITH DIFFERENTIAL    IRON/TOTAL IRON BIND    Comp Metabolic Panel      6. Hormone replacement therapy (HRT)  TSH    FREE THYROXINE    ESTRADIOL    TESTOSTERONE F&T FEMALES/CHILD    PROGESTERONE    CBC WITH DIFFERENTIAL    IRON/TOTAL IRON BIND    Comp Metabolic Panel      7. PEG (generalized anxiety disorder)  ALPRAZolam (XANAX) 0.5 MG Tab      \"  Recommend all labs as above.  She will speak with her  about snoring and we should consider a sleep study related to her fatigue, especially if labs are normal.  We should also consider a Zio patch and echocardiogram.  Fortunately she is not short of breath on activity and denies any swelling in feet or ankles.  Denies chest pain.  Denies depression.  Offered other remedies for sleep and Xanax which she declines.  She understands that she should not take Xanax within 6 hours of driving a car, operating machinery or drinking alcohol.  She understands the chemically dependent nature of Xanax.  Colonoscopy and mammogram are up-to-date.  Pap smear not indicated as she has had a " hysterectomy.  Counseled regarding the risk versus benefit of postmenopausal hormone replacement therapy such as the potential increased risk of breast and gynecological cancers as well as blood clots and she verbalized understanding.  We could consider changing her oral estrogen to a patch for more continuous delivery/hope of providing her with a better quality of life, based on results from lab work.  Encouraged her to establish with [facility] for skin cancer screenings  Anticipatory guidance:  Encouraged daily physical exercise, high fiber / vegetable based diet, 8 hours of sleep at night, skin protection from sun with suncreen / clothing, yearly derm consults.                   Please note that this dictation was created using voice recognition software. I have made every reasonable attempt to correct obvious errors, but I expect that there are errors of grammar and possibly content that I did not discover before finalizing the note.

## 2025-02-26 ENCOUNTER — RESULTS FOLLOW-UP (OUTPATIENT)
Dept: MEDICAL GROUP | Facility: LAB | Age: 55
End: 2025-02-26
Payer: COMMERCIAL

## 2025-02-26 DIAGNOSIS — E03.8 SUBCLINICAL HYPOTHYROIDISM: ICD-10-CM

## 2025-02-27 RX ORDER — THYROID 15 MG/1
15 TABLET ORAL DAILY
Qty: 30 TABLET | Refills: 3 | Status: SHIPPED | OUTPATIENT
Start: 2025-02-27

## 2025-03-01 LAB
SHBG SERPL-SCNC: 149 NMOL/L (ref 17–125)
TESTOST FREE SERPL-MCNC: 1.2 PG/ML (ref 0.6–3.8)
TESTOST SERPL-MCNC: 22 NG/DL (ref 9–55)

## 2025-04-16 ENCOUNTER — APPOINTMENT (OUTPATIENT)
Dept: MEDICAL GROUP | Facility: LAB | Age: 55
End: 2025-04-16
Payer: COMMERCIAL

## 2025-04-16 ENCOUNTER — HOSPITAL ENCOUNTER (OUTPATIENT)
Dept: LAB | Facility: MEDICAL CENTER | Age: 55
End: 2025-04-16
Attending: NURSE PRACTITIONER
Payer: COMMERCIAL

## 2025-04-16 VITALS
OXYGEN SATURATION: 98 % | SYSTOLIC BLOOD PRESSURE: 138 MMHG | HEIGHT: 65 IN | HEART RATE: 78 BPM | WEIGHT: 144 LBS | DIASTOLIC BLOOD PRESSURE: 84 MMHG | BODY MASS INDEX: 23.99 KG/M2 | TEMPERATURE: 97.2 F | RESPIRATION RATE: 12 BRPM

## 2025-04-16 DIAGNOSIS — G89.29 CHRONIC BILATERAL LOW BACK PAIN WITH RIGHT-SIDED SCIATICA: ICD-10-CM

## 2025-04-16 DIAGNOSIS — Z79.890 HORMONE REPLACEMENT THERAPY (HRT): ICD-10-CM

## 2025-04-16 DIAGNOSIS — R79.89 ABNORMAL SERUM THYROXINE (T4) LEVEL: ICD-10-CM

## 2025-04-16 DIAGNOSIS — R45.89 TEARFULNESS: ICD-10-CM

## 2025-04-16 DIAGNOSIS — M54.41 CHRONIC BILATERAL LOW BACK PAIN WITH RIGHT-SIDED SCIATICA: ICD-10-CM

## 2025-04-16 DIAGNOSIS — R61 EXCESSIVE SWEATING: ICD-10-CM

## 2025-04-16 DIAGNOSIS — R45.4 IRRITABILITY: ICD-10-CM

## 2025-04-16 DIAGNOSIS — R41.89 BRAIN FOG: ICD-10-CM

## 2025-04-16 DIAGNOSIS — R53.83 OTHER FATIGUE: ICD-10-CM

## 2025-04-16 DIAGNOSIS — E03.8 SUBCLINICAL HYPOTHYROIDISM: ICD-10-CM

## 2025-04-16 LAB
T4 FREE SERPL-MCNC: 1.09 NG/DL (ref 0.93–1.7)
TSH SERPL-ACNC: 2.53 UIU/ML (ref 0.38–5.33)

## 2025-04-16 PROCEDURE — 84443 ASSAY THYROID STIM HORMONE: CPT

## 2025-04-16 PROCEDURE — 99214 OFFICE O/P EST MOD 30 MIN: CPT | Performed by: NURSE PRACTITIONER

## 2025-04-16 PROCEDURE — 3075F SYST BP GE 130 - 139MM HG: CPT | Performed by: NURSE PRACTITIONER

## 2025-04-16 PROCEDURE — 36415 COLL VENOUS BLD VENIPUNCTURE: CPT

## 2025-04-16 PROCEDURE — 84439 ASSAY OF FREE THYROXINE: CPT

## 2025-04-16 PROCEDURE — 3079F DIAST BP 80-89 MM HG: CPT | Performed by: NURSE PRACTITIONER

## 2025-04-16 RX ORDER — ESTRADIOL 0.1 MG/D
1 FILM, EXTENDED RELEASE TRANSDERMAL
Qty: 8 PATCH | Refills: 3 | Status: SHIPPED | OUTPATIENT
Start: 2025-04-16

## 2025-04-16 ASSESSMENT — FIBROSIS 4 INDEX: FIB4 SCORE: 1.17

## 2025-04-16 NOTE — PROGRESS NOTES
Verbal consent was acquired by the patient to use [employer] ambient listening note generation during this visit Yes      Subjective   Kisha Infante is a 54 y.o. female who presents for f/u  History of Present Illness  The patient is a 54-year-old established female here for follow-up on abnormal thyroid labs, possible hormone issues and sciatica.    Abnormal t4 returned 02/25/2025, showing a TSH of 2.6 and a T4 of 0.89. She was started on NP thyroid (per her choice and recommendation from her osteopath Dr Alcala) but experienced nausea, low motivation, and moodiness, leading to discontinuation of the medication for 7 to 10 days. Improvement in her condition was noted after stopping the thyroid medication. However, she continues to experience emotional fluctuations,  excessive sweating during physical activities, fatigue, and frequent crying episodes. An increase in body odor is also reported. Concerns about potential imbalances in her system are expressed. She has been off NP Thyroid for approximately 2 weeks.  Gastrointestinal issues such as constipation or diarrhea are denied, and regular bowel movements are maintained. Bladder function is normal. Occasional palpitations are experienced, but no severe headaches, chest pain, or respiratory difficulties are reported. Skin condition is stable. No family history of hypertension is noted. She is currently on a regimen of 1 mg estradiol, taken nightly, which has been ongoing for about a decade. She had a total hysterectomy in her 30's.  Estrogen patches have not been tried previously.    Menopause occurred at the age of 35, followed by a hysterectomy with bilateral oophorectomy.    A recurrence of sciatica is reported, which she attributes to weight gain and midlife changes. Pain is described as bilateral, with radiation down the right leg, and suspected onset of similar symptoms on the left side. Exercises have been performed without significant relief, and  "physical activity is limited due to discomfort. Previous physical therapy did not result in notable improvement which she has tried intermittently x years. Pain is managed with Advil, providing temporary relief.     An episode of elevated blood pressure, with readings of 196/54, persisted for 3 days while on thyroid medication. Typically, blood pressure is low.    PAST SURGICAL HISTORY:  - Hysterectomy with bilateral oophorectomy at age 35    FAMILY HISTORY  She does not report a family history of high blood pressure. Her sister has hypothyroidism and takes levothyroxine.        Objective   /84 (BP Location: Right arm, Patient Position: Sitting, BP Cuff Size: Adult)   Pulse 78   Temp 36.2 °C (97.2 °F)   Resp 12   Ht 1.651 m (5' 5\")   Wt 65.3 kg (144 lb)   SpO2 98%   Physical Exam  Skin: No abnormalities, no rashes or lesions  Gen:  NAD, WD, WN  Resp:  CTA bilaterally   Results  Labs   - TSH: 02/25/2025, 2.6   - T4: 02/25/2025, 0.89   - Estrogen level: 73   - Kidney function: Normal   - Liver function: Normal   - Electrolytes: Normal   - Iron level: Normal   - Blood counts: Perfect, no anemia   - Immune system: Good   - Progesterone: Non-existent   - Testosterone: Normal       Assessment & Plan  1. Subclinical hypothyroidism.  - Abnormal thyroid labs drawn on 02/25/2025, with a T4 of 0.89.  Diagnostic plan: Thyroid labs will be rechecked today to determine if further thyroid replacement therapy is needed, 2 weeks off NP thyroid - would recommend trial of levothyroxine instead in a few weeks after a few week trial of estradiol patches vs pills - see below. Referral to endocrinology will be initiated for further evaluation of subclinical hypothyroidism and current myriad of symptoms.     2. Menopausal symptoms.  - Reports excessive sweating, fatigue, emotional lability, and irritability.  Treatment plan: Oral estrogen will be transitioned to a transdermal patch to potentially improve absorption and reduce " liver bypass. She will start with a 0.1 mg patch, changed twice a week.  Clinical decision making: Estrogen level is 73, within the normal range for someone on HRT. If the patch is not effective, further adjustments will be considered.    3. Sciatica.  - Reports chronic sciatica on the right side, with pain radiating down the leg.  Diagnostic plan: MRI of the lower back will be ordered to assess the condition of the discs and any potential impingement on the spinal cord. Referral to physiatry will be made for further management. Reviewed neg x-ray from 2020.   Treatment plan: has failed over 6 weeks of PT.  Referred for MRI and physiatry consult.  Continues home PT.     4. Elevated blood pressure.  Resolved. WNL today.     Follow-up: Follow-up in approximately 1 month to evaluate the effectiveness of the hormone patch and discuss the next steps based on the lab results.               Please note that this dictation was created using voice recognition software. I have made every reasonable attempt to correct obvious errors, but I expect that there are errors of grammar and possibly content that I did not discover before finalizing the note.

## 2025-04-16 NOTE — Clinical Note
Member Name: Louann Infante   Member Number: [id number]   Reference Number: [id number]   Approved Services: Consultation   Approved Service Dates: 04/16/2025 - 04/16/2026   Requesting Provider: Gabby Gayle   Requested Provider: David Robert     Dear Louann Infante:     The following medical service(s) requested by Gabby Gayle have been approved:    Procedure Code Procedure Code Name Requested Quantity Approved Quantity Status   62214 (CPT®) [place] OFFICE/OUTPATIENT NEW MODERATE MDM 45 MINUTES 1 1 Authorized   34419 (CPT®) [place] OFFICE/OUTPATIENT ESTABLISHED MOD MDM 30 MIN 5 5 Authorized       Approved Quantity means the number of visits approved for medication treatments and/or medical services.    The services should be provided by David Robert no later than 04/16/2026. Please contact the provider listed below with any questions.     Provider Information:  David Robert  [phone]    Your plan benefit may require a deductible, co-payment or coinsurance for these services. This authorization does not guarantee [employer] will pay the claim for services that you receive. Payment by [employer] for these services is subject to the terms of your Evidence of Coverage or Summary Plan Description, your eligibility at the time of service, and confirmation of benefit coverage.    For any questions or additional information, please contact Customer Service:    [employer]  Customer Service: [phone] or toll free [phone]  TTY users dial: [phone]   Call Center Hours: Mon - Fri 7 AM to 8 PM PST   Office Hours: Mon - Fri 8 AM to 5 PM [facility]   E-mail: [email or web address]   Website: [email or web address]       This information is available for free in other languages. Please contact Customer Service at the phone number above for more information. [employer] complies with applicable Federal civil rights laws and does not discriminate on the basis of race, color,  national origin, age, disability or sex.      Sincerely,     Healthcare Utilization Management Department     Cc: David Gayle

## 2025-04-17 ENCOUNTER — RESULTS FOLLOW-UP (OUTPATIENT)
Dept: MEDICAL GROUP | Facility: LAB | Age: 55
End: 2025-04-17

## 2025-04-22 NOTE — Clinical Note
Member Name: Louann Infante   Member Number: [id number]   Reference Number: [id number]   Approved Services: Consultation   Approved Service Dates: 04/16/2025 - 04/16/2026   Requesting Provider: Gabby Gayle   Requested Provider: [facility]     Dear Louann Infante:     The following medical service(s) requested by Gabby Gayle have been approved:    Procedure Code Procedure Code Name Requested Quantity Approved Quantity Status   26805 (CPT®) [place] OFFICE/OUTPATIENT NEW MODERATE MDM 45 MINUTES 1 1 Authorized   31017 (CPT®) [place] OFFICE/OUTPATIENT ESTABLISHED MOD MDM 30 MIN 5 5 Authorized       Approved Quantity means the number of visits approved for medication treatments and/or medical services.    The services should be provided by [facility] no later than 04/16/2026. Please contact the provider listed below with any questions.     Provider Information:  [facility]  [phone]    Your plan benefit may require a deductible, co-payment or coinsurance for these services. This authorization does not guarantee [facility] will pay the claim for services that you receive. Payment by [facility] for these services is subject to the terms of your Evidence of Coverage or Summary Plan Description, your eligibility at the time of service, and confirmation of benefit coverage.    For any questions or additional information, please contact Customer Service:    [facility]  Customer Service: [phone] or toll free [phone]  TTY users dial: [phone]   Call Center Hours: Mon - Fri 7 AM to 8 PM PST   Office Hours: Mon - Fri 8 AM to 5 PM [facility]   E-mail: [email or web address]   Website: [email or web address]       This information is available for free in other languages. Please contact Customer Service at the phone number above for more information. [facility] complies with applicable Federal civil rights laws and  does not discriminate on the basis of race, color, national origin, age, disability or sex.      Sincerely,     Healthcare Utilization Management Department     Cc: [facility]   Gabby Gayle

## 2025-04-25 NOTE — Clinical Note
REFERRAL APPROVAL NOTICE         Sent on April 25, 2025                   Kisha Infante  [address]  [place] [address]                   Dear Ms. Infante,    After a careful review of the medical information and benefit coverage, [facility] has processed your referral. See below for additional details.    If applicable, you must be actively enrolled with your insurance for coverage of the authorized service. If you have any questions regarding your coverage, please contact your insurance directly.    REFERRAL INFORMATION   Referral #:  [id number]  Referred-To Department    Referred-By Provider:  Endocrinology    ELMER Donohue   Endocrinology [facility]      [address]  [place] [address]  [phone] [address]  [place] [address] [phone]    Referral Start Date:  04/16/2025  Referral End Date:   04/16/2026           SCHEDULING  If you do not already have an appointment, please call [phone] to make an appointment.   MORE INFORMATION  As a reminder, [facility] ownership has changed, meaning this location is now owned and operated by [facility]. As such, we want to clarify that our patients should expect to receive two separate bills for the services received at [facility] - one representing the [facility] facility fees as the owner of the establishment, and the other to represent the physician's services and subsequent fees. You can speak with your insurance carrier for a pricing estimate by calling the customer service number on the back of your card and ask about charges for a hospital outpatient visit.  If you do not already have a [employer] account, sign up at: [email or web address]  You can access your medical information, make appointments, see lab results, billing information, and more.  If you have questions regarding this referral, please contact  the [facility] Referrals department at:              [phone]. Monday - Friday 7:30AM - 5:00PM.      Sincerely,  [facility]     Xelcameronz Pregnancy And Lactation Text: This medication is Pregnancy Category D and is not considered safe during pregnancy.  The risk during breast feeding is also uncertain.

## 2025-05-14 ENCOUNTER — APPOINTMENT (OUTPATIENT)
Dept: MEDICAL GROUP | Facility: LAB | Age: 55
End: 2025-05-14
Payer: COMMERCIAL

## 2025-05-15 ENCOUNTER — HOSPITAL ENCOUNTER (OUTPATIENT)
Dept: RADIOLOGY | Facility: MEDICAL CENTER | Age: 55
End: 2025-05-15
Attending: NURSE PRACTITIONER
Payer: COMMERCIAL

## 2025-05-15 DIAGNOSIS — M79.671 RIGHT FOOT PAIN: ICD-10-CM

## 2025-05-15 DIAGNOSIS — M54.41 CHRONIC BILATERAL LOW BACK PAIN WITH RIGHT-SIDED SCIATICA: ICD-10-CM

## 2025-05-15 DIAGNOSIS — M79.672 LEFT FOOT PAIN: ICD-10-CM

## 2025-05-15 DIAGNOSIS — G89.29 CHRONIC BILATERAL LOW BACK PAIN WITH RIGHT-SIDED SCIATICA: ICD-10-CM

## 2025-05-15 PROCEDURE — 72148 MRI LUMBAR SPINE W/O DYE: CPT

## 2025-05-15 PROCEDURE — 73630 X-RAY EXAM OF FOOT: CPT | Mod: LT

## 2025-05-15 PROCEDURE — 73630 X-RAY EXAM OF FOOT: CPT | Mod: RT

## 2025-05-19 ENCOUNTER — RESULTS FOLLOW-UP (OUTPATIENT)
Dept: MEDICAL GROUP | Facility: LAB | Age: 55
End: 2025-05-19

## 2025-05-20 ENCOUNTER — OFFICE VISIT (OUTPATIENT)
Dept: MEDICAL GROUP | Facility: LAB | Age: 55
End: 2025-05-20
Payer: COMMERCIAL

## 2025-05-20 VITALS
SYSTOLIC BLOOD PRESSURE: 118 MMHG | BODY MASS INDEX: 24.49 KG/M2 | DIASTOLIC BLOOD PRESSURE: 70 MMHG | RESPIRATION RATE: 12 BRPM | WEIGHT: 147 LBS | OXYGEN SATURATION: 98 % | HEIGHT: 65 IN | HEART RATE: 82 BPM | TEMPERATURE: 97.1 F

## 2025-05-20 DIAGNOSIS — M77.30 CALCANEAL SPUR, UNSPECIFIED LATERALITY: ICD-10-CM

## 2025-05-20 DIAGNOSIS — M54.41 ACUTE RIGHT-SIDED LOW BACK PAIN WITH RIGHT-SIDED SCIATICA: ICD-10-CM

## 2025-05-20 DIAGNOSIS — Z79.890 HORMONE REPLACEMENT THERAPY (HRT): ICD-10-CM

## 2025-05-20 PROCEDURE — 99214 OFFICE O/P EST MOD 30 MIN: CPT | Performed by: NURSE PRACTITIONER

## 2025-05-20 PROCEDURE — 3074F SYST BP LT 130 MM HG: CPT | Performed by: NURSE PRACTITIONER

## 2025-05-20 PROCEDURE — 3078F DIAST BP <80 MM HG: CPT | Performed by: NURSE PRACTITIONER

## 2025-05-20 RX ORDER — ESTRADIOL 0.1 MG/D
1 FILM, EXTENDED RELEASE TRANSDERMAL
Qty: 24 PATCH | Refills: 3 | Status: SHIPPED | OUTPATIENT
Start: 2025-05-20

## 2025-05-20 ASSESSMENT — FIBROSIS 4 INDEX: FIB4 SCORE: 1.17

## 2025-05-20 NOTE — PROGRESS NOTES
Verbal consent was acquired by the patient to use [employer] ambient listening note generation during this visit Yes      Joanna Infante is a 54 y.o. female who presents for follow-up  History of Present Illness  The patient is a 54-year-old established female here for a 1-month follow-up. She was seen a month ago regarding subclinical hypothyroidism, menopausal symptoms, sciatica, and elevated blood pressure on home readings.    She did thyroid labs on 04/16/2025, which showed a TSH of 2.5 and a T4 of 1. She is not on any thyroid replacement at this time.    At her last visit, her oral estrogen was changed to a transdermal patch at 0.1 mg. She has had a hysterectomy and does not need progesterone. She reports an improvement in her energy levels and a reduction in fatigue since transitioning from oral estrogen to a transdermal patch. Most of her menopausal symptoms have resolved, although she continues to struggle with weight gain despite regular exercise. Her sleep quality has improved, and she no longer experiences excessive daytime sleepiness.    With regards to low back pain with right-sided sciatica, she got an MRI of her lumbar spine, which came back essentially normal with the exception of minimal degenerative changes. There was a comment on her MRI regarding a tiny perineural cyst at L2-3 as well as minimal scattered spurring throughout the lumbar spine. All disk height was normal. She continues to experience discomfort in her back, which radiates down her right leg. She has previously undergone physical therapy but expresses a preference for a female therapist. She has an upcoming appointment with a physiatrist on 06/04/2025. She has also sought treatment from a muscle manipulator, which involved deep tissue massage and heat application, and has been advised to maintain adequate hydration and use Bengay for sore areas. This treatment is scheduled monthly. Additionally, she has undergone  "cupping therapy on her hamstring and sciatic area, which she reports as beneficial.    She had x-rays of her feet, which revealed bilateral heel spurs.  She plans to visit [facility] to explore potential solutions, as her current footwear appears to be contributing to the issue. She frequently wears sandals and owns a pair of Brown running shoes. She has found that arch support shoes provide some relief, but the bone remains painful. She believes that consistent use of ibuprofen, Motrin, or Aleve may alleviate her symptoms.    She has enough valacyclovir if she needs it.        Review of Systems  Negative except for HPI  Objective   /70 (BP Location: Right arm, Patient Position: Sitting, BP Cuff Size: Adult)   Pulse 82   Temp 36.2 °C (97.1 °F)   Resp 12   Ht 1.651 m (5' 5\")   Wt 66.7 kg (147 lb)   SpO2 98%   Physical Exam  Gen. appears healthy in no distress   Skin appropriate for sex and age   Neck trachea is midline  Lungs unlabored breathing  Heart regular rate  Neuro gait and station normal   Psych appropriate, calm, interactive, well-groomed    Results  Labs   - TSH: 04/16/2025, 2.5   - T4: 04/16/2025, 1    Imaging   - MRI of lumbar spine: Minimal degenerative changes, tiny perineural cyst at L2-3, and minimal scattered spurring throughout the lumbar spine. All disk height was normal.   - X-rays of feet: Heel spurs on both heels.       Assessment & Plan  1. Subclinical hypothyroidism  Thyroid labs from 04/16/2025 showed a TSH of 2.5 and a T4 of 1.  Treatment plan: No current thyroid replacement therapy.    2. Menopausal symptoms  Reports feeling less tired and having more energy since switching from oral estrogen to a transdermal patch at 0.1 mg.  Treatment plan: Continues with the current dose of the transdermal estrogen patch. Prescription for the patches will be adjusted to a 90-day supply to avoid monthly pharmacy visits.    3. Right low back pain with right-sided sciatica  MRI showed minimal " degenerative changes and tiny perineural cysts at L2-3, which could be causing pain.  Treatment plan: Referral to physical therapy twice a week for 6 weeks. Appointment with a physiatrist, on 06/04/2025 to discuss further management options/get his opinion.    4. Heel spurs  Heel spurs on both heels likely causing inflammation and pain.  Treatment plan: Advised to wear supportive shoes like Brown running shoes more often. Over-the-counter anti-inflammatories such as ibuprofen, Aleve, Motrin, or Advil can be used as needed.  She could consider seeing podiatry or orthopedics if preferred.                   Please note that this dictation was created using voice recognition software. I have made every reasonable attempt to correct obvious errors, but I expect that there are errors of grammar and possibly content that I did not discover before finalizing the note.

## 2025-05-28 ENCOUNTER — PATIENT MESSAGE (OUTPATIENT)
Dept: MEDICAL GROUP | Facility: LAB | Age: 55
End: 2025-05-28
Payer: COMMERCIAL

## 2025-05-28 DIAGNOSIS — F41.1 GAD (GENERALIZED ANXIETY DISORDER): ICD-10-CM

## 2025-05-28 RX ORDER — ALPRAZOLAM 0.5 MG
0.5 TABLET ORAL NIGHTLY PRN
Qty: 30 TABLET | Refills: 2 | Status: SHIPPED | OUTPATIENT
Start: 2025-05-28 | End: 2025-06-27

## 2025-06-04 ENCOUNTER — APPOINTMENT (OUTPATIENT)
Dept: PHYSICAL MEDICINE AND REHAB | Facility: MEDICAL CENTER | Age: 55
End: 2025-06-04
Payer: COMMERCIAL

## 2025-06-04 VITALS
SYSTOLIC BLOOD PRESSURE: 124 MMHG | OXYGEN SATURATION: 99 % | DIASTOLIC BLOOD PRESSURE: 86 MMHG | TEMPERATURE: 98.6 F | WEIGHT: 147 LBS | HEART RATE: 66 BPM | HEIGHT: 65 IN | BODY MASS INDEX: 24.49 KG/M2

## 2025-06-04 DIAGNOSIS — G89.29 CHRONIC RIGHT-SIDED LOW BACK PAIN WITH RIGHT-SIDED SCIATICA: ICD-10-CM

## 2025-06-04 DIAGNOSIS — M79.604 RIGHT LEG PAIN: ICD-10-CM

## 2025-06-04 DIAGNOSIS — G57.01 PIRIFORMIS SYNDROME OF RIGHT SIDE: Primary | ICD-10-CM

## 2025-06-04 DIAGNOSIS — M54.41 CHRONIC RIGHT-SIDED LOW BACK PAIN WITH RIGHT-SIDED SCIATICA: ICD-10-CM

## 2025-06-04 PROCEDURE — 3079F DIAST BP 80-89 MM HG: CPT | Performed by: PHYSICAL MEDICINE & REHABILITATION

## 2025-06-04 PROCEDURE — 1125F AMNT PAIN NOTED PAIN PRSNT: CPT | Performed by: PHYSICAL MEDICINE & REHABILITATION

## 2025-06-04 PROCEDURE — 3074F SYST BP LT 130 MM HG: CPT | Performed by: PHYSICAL MEDICINE & REHABILITATION

## 2025-06-04 PROCEDURE — 99204 OFFICE O/P NEW MOD 45 MIN: CPT | Performed by: PHYSICAL MEDICINE & REHABILITATION

## 2025-06-04 RX ORDER — MELOXICAM 15 MG/1
15 TABLET ORAL
Qty: 90 TABLET | Refills: 0 | Status: SHIPPED | OUTPATIENT
Start: 2025-06-04 | End: 2025-09-02

## 2025-06-04 ASSESSMENT — PAIN SCALES - GENERAL: PAINLEVEL_OUTOF10: 7=MODERATE-SEVERE PAIN

## 2025-06-04 ASSESSMENT — PATIENT HEALTH QUESTIONNAIRE - PHQ9
CLINICAL INTERPRETATION OF PHQ2 SCORE: 2
SUM OF ALL RESPONSES TO PHQ QUESTIONS 1-9: 7
5. POOR APPETITE OR OVEREATING: 0 - NOT AT ALL

## 2025-06-04 ASSESSMENT — FIBROSIS 4 INDEX: FIB4 SCORE: 1.17

## 2025-06-04 NOTE — PROGRESS NOTES
[facility] (Physical Medicine and Rehabilitation)  Interventional Pain and Spine   New Patient Visit      Date of service: See epic    Chief complaint:   Chief Complaint   Patient presents with    New Patient     Chronic bilateral low back pain with right-sided sciatica        Referring provider: Gabby Gayle A*     HISTORY    HPI: Louann Infante 54 y.o.  who presents today with The primary encounter diagnosis was Piriformis syndrome of right side. Diagnoses of Chronic right-sided low back pain with right-sided sciatica and Right leg pain were also pertinent to this visit.    HPI    Verbal consent was obtained for Tony copilot: Yes      History of Present Illness  The patient is a 54-year-old female presenting with right-sided lumbar pain radiating down the right lower extremity, rated 7/10, escalating to 9/10 on the Joanne scale. The pain exacerbates nocturnally and with activities such as sitting, standing, bending, lifting, and bed mobility. The patient suspects sciatica, with symptoms persisting for 4-5 months. The pain is localized to the central gluteal region and extends distally down the leg, calf, and foot, accompanied by occasional myalgia. The pain intensifies during bed rest and occasionally radiates to the left lower extremity. The patient is scheduled for physical therapy next week and seeks guidance on the continuation of her exercise regimen. She has a history of similar episodes, the most recent occurring 2 years ago, which were previously managed with ibuprofen and cessation of exercise, though these measures are currently ineffective. She is adhering to an exercise regimen and stretches prescribed during previous physical therapy sessions.    Right-Sided Lumbar Pain and Suspected Sciatica  - Onset: Symptoms persisting for 4-5 months.  - Location: Right-sided lumbar region, central gluteal region, extending distally down the leg, calf, and foot; occasionally radiates to the  left lower extremity.  - Duration: 4-5 months.  - Character: Radiating pain, occasional myalgia.  - Alleviating/Aggravating Factors: Exacerbates nocturnally and with activities such as sitting, standing, bending, lifting, and bed mobility; previously managed with ibuprofen and cessation of exercise, currently ineffective.  - Radiation: Radiates down the right lower extremity, occasionally to the left lower extremity.  - Timing: Pain intensifies during bed rest.  - Severity: Rated 7/10, escalating to 9/10 on the Joanne scale.    MEDICATIONS  - Advil          Medical records review:  I reviewed the note from the referring provider Gabby Gayle A* .           ROS:   Red Flags ROS:   Fever, Chills, Sweats: Denies  Involuntary Weight Loss: Denies  Bladder Incontinence: Denies  Bowel Incontinence: denies  Saddle Anesthesia: Denies    All other systems reviewed and negative.       PMHx:   Past Medical History[1]      Medications Ordered Prior to Encounter[2]     PSHx:   Past Surgical History[3]    Family history   Family History   Problem Relation Age of Onset    Diabetes Father     Heart Disease Father     Cancer Paternal Grandfather         lymphoma    Stroke Neg Hx          Medications: reviewed on epic.   Active Medications[4]     Allergies:   Allergies[5]    Social Hx:   Social History     Socioeconomic History    Marital status:      Spouse name: Not on file    Number of children: Not on file    Years of education: Not on file    Highest education level: Not on file   Occupational History    Not on file   Tobacco Use    Smoking status: Never    Smokeless tobacco: Never   Vaping Use    Vaping status: Never Used   Substance and Sexual Activity    Alcohol use: Yes     Comment: occas    Drug use: No    Sexual activity: Not on file     Comment: , homemaker   Other Topics Concern    Not on file   Social History Narrative    Not on file     Social Drivers of Health     Financial Resource Strain: Not on  "file   Food Insecurity: Not on file   Transportation Needs: Not on file   Physical Activity: Not on file   Stress: Not on file   Social Connections: Not on file   Intimate Partner Violence: Not on file   Housing Stability: Not on file         EXAMINATION     Physical Exam:   Vitals: /86   Pulse 66   Temp 37 °C (98.6 °F) (Temporal)   Ht 1.651 m (5' 5\")   Wt 66.7 kg (147 lb)   SpO2 99%     Constitutional:   Body Habitus: Body mass index is 24.46 kg/m².  Cooperation: Fully cooperates with exam  Appearance: Well-groomed, well-nourished, not disheveled     Eyes: No scleral icterus to suggest severe liver disease, no proptosis to suggest severe hyperthyroid    ENT -no obvious auditory deficits, no obvious tongue lesions, tongue midline, no facial droop     Skin -no rashes or lesions noted     Respiratory-  breathing comfortable on room air, no audible wheezing    Cardiovascular- capillary refills less than 2 seconds.     Psychiatric- alert and oriented ×3. Normal affect.       Musculoskeletal and Neuro -     Physical Exam  Musculoskeletal exam performed.     Thoracic/Lumbar Spine/Sacral Spine/Hips   Inspection: No evidence of atrophy in bilateral lower extremities throughout     ROM: full  active range of motion with flexion, lateral flexion, and rotation bilaterally.   There is full  active range of motion with lumbar extension without pain.    There is no pain with facet loading maneuver (extension rotation) with axial low back pain on the BILATERAL side(s)    Palpation:   No tenderness to palpation in midline at T1-T12 levels. No tenderness to palpation in the left and right of the midline T1-L5, NEGATIVE for tenderness to palpation to the para-midline region in the lower lumbar levels.  palpation over SI joint: negative bilaterally    palpation in hip or over the gluteus medius tendon insertion: negative bilaterally      Lumbar spine Special tests  Neuro tension  Straight leg test negative bilaterally  " "  Slump test negative bilaterally      HIP  FAIR test negative bilaterally    Range of motion in the RIGHT hip is full  in flexion, extension, abduction, internal rotation, external rotation.  Range of motion in the LEFT hip is full  in flexion, extension, abduction, internal rotation, external rotation.      SI joint tests  Observation patient sits on one buttocks: Negative  SI joint compression negative bilaterally    SI joint distraction negative bilaterally    Thigh thrust test negative bilaterally    DELMI test negative bilaterally                 Key points for the international standards for neurological classification of spinal cord injury (ISNCSCI) to light touch.     Dermatome R L                                      L2 2 2   L3 2 2   L4 2 2   L5 2 2   S1 2 2   S2 2 2       Motor Exam Lower Extremities    ? Myotome R L   Hip flexion L2 5 5   Knee extension L3 5 5   Ankle dorsiflexion L4 5 5   Toe extension L5 5 5   Ankle plantarflexion S1 5 5         Reflexes  ?  R L                Patella  2+ 2+   Achilles   2+ 2+       Babinski sign negative bilaterally   Clonus of the ankle negative bilaterally       MEDICAL DECISION MAKING    Medical records review: see under HPI section.     DATA    Labs:   Lab Results   Component Value Date/Time    SODIUM 139 02/25/2025 03:46 PM    POTASSIUM 4.1 02/25/2025 03:46 PM    CHLORIDE 104 02/25/2025 03:46 PM    CO2 24 02/25/2025 03:46 PM    ANION 11.0 02/25/2025 03:46 PM    GLUCOSE 97 02/25/2025 03:46 PM    BUN 15 02/25/2025 03:46 PM    CREATININE 0.81 02/25/2025 03:46 PM    CREATININE 0.8 06/26/2006 08:20 AM    CALCIUM 9.1 02/25/2025 03:46 PM    ASTSGOT 20 02/25/2025 03:46 PM    ALTSGPT 16 02/25/2025 03:46 PM    TBILIRUBIN 0.2 02/25/2025 03:46 PM    ALBUMIN 4.2 02/25/2025 03:46 PM    TOTPROTEIN 7.0 02/25/2025 03:46 PM    GLOBULIN 2.8 02/25/2025 03:46 PM    AGRATIO 1.5 02/25/2025 03:46 PM   ]    No results found for: \"PROTHROMBTM\", \"INR\"     Lab Results   Component Value " "Date/Time    WBC 5.8 02/25/2025 03:46 PM    RBC 4.37 02/25/2025 03:46 PM    HEMOGLOBIN 13.7 02/25/2025 03:46 PM    HEMATOCRIT 41.7 02/25/2025 03:46 PM    MCV 95.4 02/25/2025 03:46 PM    MCH 31.4 02/25/2025 03:46 PM    MCHC 32.9 02/25/2025 03:46 PM    MPV 10.2 02/25/2025 03:46 PM    NEUTSPOLYS 57.20 02/25/2025 03:46 PM    LYMPHOCYTES 33.10 02/25/2025 03:46 PM    MONOCYTES 7.80 02/25/2025 03:46 PM    EOSINOPHILS 1.20 02/25/2025 03:46 PM    BASOPHILS 0.50 02/25/2025 03:46 PM        No results found for: \"HBA1C\"     Imaging:   I personally reviewed following images, these are my reads      Results  - MRI lumbar spine (05/15/2025):    - No high-grade central canal or neuroforaminal stenosis    - Mild degenerative changes with mild facet arthropathy at L4-5 and L5-S1    - No acute or chronic fractures         IMAGING radiology reads. I reviewed the following radiology reads                      Results for orders placed in visit on 05/15/25    MR-LUMBAR SPINE-W/O    Impression  1.  Minimal degenerative changes.  2.  No central stenosis, foraminal stenosis, or significant disc bulge or protrusion at any lumbar level.        Results for orders placed in visit on 05/15/25    MR-LUMBAR SPINE-W/O    Impression  1.  Minimal degenerative changes.  2.  No central stenosis, foraminal stenosis, or significant disc bulge or protrusion at any lumbar level.                                       Results for orders placed during the hospital encounter of 08/13/18    DX-CERVICAL SPINE-2 OR 3 VIEWS    Impression  Normal cervical spine.            Results for orders placed during the hospital encounter of 05/15/25    DX-FOOT-COMPLETE 3+ LEFT    Impression  No evidence of fracture or dislocation.               Results for orders placed during the hospital encounter of 07/14/20    DX-LUMBAR SPINE-2 OR 3 VIEWS    Impression  Unremarkable lumbar spine series.            INTERPRETING LOCATION:  [address] [place] [address], [place], [id number]           "               Diagnosis   Visit Diagnoses     ICD-10-CM   1. Piriformis syndrome of right side  G57.01   2. Chronic right-sided low back pain with right-sided sciatica  M54.41    G89.29   3. Right leg pain  M79.604           ASSESSMENT AND PLAN:  Louann Rodríguezmichaelle 54 y.o. female      Kisha was seen today for new patient.    Diagnoses and all orders for this visit:    Piriformis syndrome of right side  -     meloxicam (MOBIC) 15 MG tablet; Take 1 Tablet by mouth 1 time a day as needed (pain. take once daily for two weeks) for up to 90 days. Do not take other NSAIDs.  -     Referral to Physical Therapy    Chronic right-sided low back pain with right-sided sciatica  -     meloxicam (MOBIC) 15 MG tablet; Take 1 Tablet by mouth 1 time a day as needed (pain. take once daily for two weeks) for up to 90 days. Do not take other NSAIDs.  -     Referral to Physical Therapy    Right leg pain  -     meloxicam (MOBIC) 15 MG tablet; Take 1 Tablet by mouth 1 time a day as needed (pain. take once daily for two weeks) for up to 90 days. Do not take other NSAIDs.  -     Referral to Physical Therapy            Assessment & Plan  The low back pain is minimal. There is more buttocks pain and leg pain consistent with Piriformis syndrome: Chronic. MRI lumbar spine (05/15/2025) shows no high-grade central canal or neuroforaminal stenosis, no acute or chronic fractures, mild degenerative changes with mild facet arthropathy at L4-5 and L5-S1. Diagnosis based on symptoms and physical exam.  - Continue physical therapy with specific exercises and stretches for piriformis syndrome.  - Prescribe meloxicam 15 mg once daily for 2 weeks, then once daily as needed.  Ideally the patient is using meloxicam only during flares of pain and not using this daily indefinitely.  - We discussed potential piriformis muscle injection and sciatic nerve injection however it was deemed not necessary at this time and the patient will likely improve with  "conservative treatments.  - Contact office if symptoms worsen.    Follow-up  - I am referring the patient back to her PCP the patient can follow-up with me as needed          Please note that this dictation was created using voice recognition software. I have made every reasonable attempt to correct obvious errors but there may be errors of grammar and content that I may have overlooked prior to finalization of this note.      David Robert MD  Physical Medicine and Rehabilitation  Interventional Spine and Sports Physiatry  [facility] [place] Donoh[place]        [1]   Past Medical History:  Diagnosis Date    Migraine     Pharyngitis 9/14/2017    Sleep disorder    [2]   Current Outpatient Medications on File Prior to Visit   Medication Sig Dispense Refill    ALPRAZolam (XANAX) 0.5 MG Tab Take 1 Tablet by mouth at bedtime as needed for Sleep for up to 30 days. Please allow to fill today, leaving tomorrow for a week. 30 Tablet 2    estradiol (ARACELI) 0.1 MG/24HR PATCH BIWEEKLY Place 1 Patch on the skin two times a week. 24 Patch 3    valacyclovir (VALTREX) 1 GM Tab Take 2 pills at onset of \"cold sore\" outbreak - may repeat once the following day OR take one pill daily to prevent cold sore outbreaks. 90 Tablet 2     No current facility-administered medications on file prior to visit.   [3]   Past Surgical History:  Procedure Laterality Date    ABDOMINAL HYSTERECTOMY TOTAL  2006    bleeding,vaginal    CHOLECYSTECTOMY  1997    NC BREAST AUGMENTATION WITH IMPLANT Bilateral     PRIMARY C SECTION      x2   [4]   Outpatient Medications Marked as Taking for the 6/4/25 encounter (Office Visit) with David Robert M.D.   Medication Sig Dispense Refill    meloxicam (MOBIC) 15 MG tablet Take 1 Tablet by mouth 1 time a day as needed (pain. take once daily for two weeks) for up to 90 days. Do not take other NSAIDs. 90 Tablet 0    ALPRAZolam (XANAX) 0.5 MG Tab Take 1 Tablet by mouth at bedtime as needed " "for Sleep for up to 30 days. Please allow to fill today, leaving tomorrow for a week. 30 Tablet 2    estradiol (ARACELI) 0.1 MG/24HR PATCH BIWEEKLY Place 1 Patch on the skin two times a week. 24 Patch 3    valacyclovir (VALTREX) 1 GM Tab Take 2 pills at onset of \"cold sore\" outbreak - may repeat once the following day OR take one pill daily to prevent cold sore outbreaks. 90 Tablet 2   [5]   Allergies  Allergen Reactions    Codeine Vomiting     Hallucinations    Sulfa Drugs Vomiting     "

## 2025-06-09 DIAGNOSIS — Z79.890 HORMONE REPLACEMENT THERAPY (HRT): ICD-10-CM

## 2025-06-09 DIAGNOSIS — E03.8 SUBCLINICAL HYPOTHYROIDISM: ICD-10-CM

## 2025-06-09 DIAGNOSIS — R63.5 WEIGHT GAIN: Primary | ICD-10-CM

## 2025-06-17 ENCOUNTER — OFFICE VISIT (OUTPATIENT)
Dept: MEDICAL GROUP | Facility: LAB | Age: 55
End: 2025-06-17
Payer: COMMERCIAL

## 2025-06-17 VITALS
BODY MASS INDEX: 23.99 KG/M2 | DIASTOLIC BLOOD PRESSURE: 86 MMHG | WEIGHT: 144 LBS | RESPIRATION RATE: 12 BRPM | HEIGHT: 65 IN | OXYGEN SATURATION: 99 % | SYSTOLIC BLOOD PRESSURE: 130 MMHG | HEART RATE: 76 BPM | TEMPERATURE: 98.8 F

## 2025-06-17 DIAGNOSIS — Z79.890 PREMATURE SURGICAL MENOPAUSE ON HRT: ICD-10-CM

## 2025-06-17 DIAGNOSIS — R63.5 WEIGHT GAIN: ICD-10-CM

## 2025-06-17 DIAGNOSIS — R53.83 OTHER FATIGUE: ICD-10-CM

## 2025-06-17 DIAGNOSIS — E89.40 PREMATURE SURGICAL MENOPAUSE ON HRT: ICD-10-CM

## 2025-06-17 PROCEDURE — 99214 OFFICE O/P EST MOD 30 MIN: CPT | Performed by: NURSE PRACTITIONER

## 2025-06-17 PROCEDURE — 3075F SYST BP GE 130 - 139MM HG: CPT | Performed by: NURSE PRACTITIONER

## 2025-06-17 PROCEDURE — 3079F DIAST BP 80-89 MM HG: CPT | Performed by: NURSE PRACTITIONER

## 2025-06-17 RX ORDER — PROGESTERONE 100 MG/1
100 CAPSULE ORAL DAILY
Qty: 30 CAPSULE | Refills: 5 | Status: SHIPPED | OUTPATIENT
Start: 2025-06-17

## 2025-06-17 ASSESSMENT — FIBROSIS 4 INDEX: FIB4 SCORE: 1.17

## 2025-06-17 NOTE — Clinical Note
REFERRAL APPROVAL NOTICE         Sent on June 17, 2025                   Kisha Infante  [address]  [place] [address]                   Dear Ms. Infante,    After a careful review of the medical information and benefit coverage, [facility] has processed your referral. See below for additional details.    If applicable, you must be actively enrolled with your insurance for coverage of the authorized service. If you have any questions regarding your coverage, please contact your insurance directly.    REFERRAL INFORMATION   Referral #:  [id number]  Referred-To Department    Referred-By Provider:  ELMER Ray   [facility]      [address]  [place] [id number] [phone] [address] [place] [address] [phone]    Referral Start Date:  06/09/2025  Referral End Date:   06/09/2026             SCHEDULING  If you do not already have an appointment, please call [phone] to make an appointment.     MORE INFORMATION  If you do not already have a [employer] account, sign up at: [email or web address]  You can access your medical information, make appointments, see lab results, billing information, and more.  If you have questions regarding this referral, please contact  the [facility] Referrals department at:             [phone]. Monday - Friday 8:00AM - 5:00PM.     Sincerely,    [facility]

## 2025-06-18 NOTE — PROGRESS NOTES
Verbal consent was acquired by the patient to use [employer] ambient listening note generation during this visit Yes      Subjective   Kisha Infante is a 54 y.o. female who presents for f/u   History of Present Illness  The patient is a 54-year-old established female here for a follow-up visit. She was last seen about a month ago regarding subclinical hypothyroidism and preferred to stay off thyroid replacement.  Repeat thyroid labs were also within normal limits on last check.  She also had menopausal symptoms, and her treatment was switched from oral estrogen to a patch. Additionally, she experienced back pain and was diagnosed with piriformis syndrome by physiatry.    She reports experiencing pain in her buttock region, which has been diagnosed as piriformis syndrome. She was advised to undergo physical therapy at [email or web address],    She has transitioned from oral estrogen to an estrogen patch. She reports feeling less lethargic during the day and has noticed an improvement in her mobility. However, she still experiences a sensation of heaviness during exercise, akin to dragging a piano, and excessive sweating. She also reports a persistent body odor, reminiscent of onions, which is particularly noticeable under her armpits. She has never taken progesterone. She manages her sleep with Xanax. She has been on oral estrogen since her hysterectomy 20 years ago. She has noticed weight gain, particularly around her abdomen, which she finds undesirable. She admits to not exercising when she was unwell but has resumed her normal exercise routine for the past 2 months. Despite this, she continues to feel a sense of heaviness during her workouts. Her exercise regimen includes using her Peloton every weekday, riding between 8 and 13 miles each morning, followed by sit-ups and other exercises. She also walks 3 miles after dinner each night. She reports no chest pain or breathing difficulties. She feels winded  "when going up and down the stairs.      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    SOCIAL HISTORY  She does not smoke.    Review of Systems  Negative except for HPI  Objective   /86 (BP Location: Left arm, Patient Position: Sitting, BP Cuff Size: Adult)   Pulse 76   Temp 37.1 °C (98.8 °F)   Resp 12   Ht 1.651 m (5' 5\")   Wt 65.3 kg (144 lb)   SpO2 99%   Physical Exam  Well developed, well nourished female in no apparent distress.  Eyes: Conjunctivae and sclerae are clear and non-icteric. Pupils are equally round and reactive to light, extra-ocular movements intact.   Neck: Supple; there is no adenopathy. No supraclavicular adenopathy is noted.  CV: Heart is regular without murmur, rub or gallop.  Pulm: Clear to auscultation.  Psychiatric: The patient is alert and oriented in all four spheres. Mood is euthymic. Affect is appropriate for the situation.  Skin: No rashes were noted.  Musculoskeletal: Gait is normal. Patient is able to transfer from sitting position to exam table without assistance.         Assessment & Plan  1. Piriformis Syndrome  Diagnosed with piriformis syndrome by physiatry.  Starting physical therapy.  Will follow-up with physiatry if not improving.    2. Menopausal Symptoms  Switched from oral estrogen to an estrogen patch. Reports feeling less fatigue during the day but still experiences significant fatigue during exercise.  Treatment plan: Progesterone will be added to her regimen for one month to address symptoms such as odor and sleep disturbances. Will take progesterone daily in the evening; further evaluation will be considered if there is no improvement.  Can certainly replace Xanax with progesterone if this is helpful.    3. Subclinical Hypothyroidism  Thyroid levels were recently checked and found to be within normal limits.  Recheck 6 months.    4. Weight Management  Experiencing weight gain and difficulty losing " weight despite regular exercise.  Treatment plan: Advised to try fasting exercise in the morning and consider taking creatine (5 g micronized) and taurine (400 mg) daily in the morning for one month. Further evaluation will be considered if there is no improvement.  She will report back to me regarding how she is doing in 1 month.    5.  Heaviness during exercise:  Trial of creatine and taurine for 1 month along with progesterone at night.  Consider echocardiogram and chest x-ray if not improving.                   Please note that this dictation was created using voice recognition software. I have made every reasonable attempt to correct obvious errors, but I expect that there are errors of grammar and possibly content that I did not discover before finalizing the note.

## 2025-08-27 ENCOUNTER — PATIENT MESSAGE (OUTPATIENT)
Dept: MEDICAL GROUP | Facility: LAB | Age: 55
End: 2025-08-27
Payer: COMMERCIAL

## 2025-08-27 DIAGNOSIS — F41.1 GAD (GENERALIZED ANXIETY DISORDER): ICD-10-CM

## 2025-08-27 RX ORDER — ALPRAZOLAM 0.5 MG
0.5 TABLET ORAL NIGHTLY PRN
Qty: 30 TABLET | Refills: 2 | Status: SHIPPED | OUTPATIENT
Start: 2025-08-27 | End: 2025-09-26